# Patient Record
Sex: MALE | Race: WHITE
[De-identification: names, ages, dates, MRNs, and addresses within clinical notes are randomized per-mention and may not be internally consistent; named-entity substitution may affect disease eponyms.]

---

## 2020-03-04 ENCOUNTER — HOSPITAL ENCOUNTER (OUTPATIENT)
Dept: HOSPITAL 76 - EMS | Age: 75
Discharge: ANOTHER HOSPITAL | End: 2020-03-04
Attending: SURGERY
Payer: OTHER GOVERNMENT

## 2020-03-04 DIAGNOSIS — S09.90XA: Primary | ICD-10-CM

## 2020-03-04 DIAGNOSIS — R41.82: ICD-10-CM
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
## 2020-12-27 ENCOUNTER — HOSPITAL ENCOUNTER (EMERGENCY)
Dept: HOSPITAL 76 - ED | Age: 75
Discharge: HOME | End: 2020-12-27
Payer: OTHER GOVERNMENT

## 2020-12-27 VITALS — SYSTOLIC BLOOD PRESSURE: 130 MMHG | DIASTOLIC BLOOD PRESSURE: 80 MMHG

## 2020-12-27 DIAGNOSIS — F41.9: ICD-10-CM

## 2020-12-27 DIAGNOSIS — Z79.82: ICD-10-CM

## 2020-12-27 DIAGNOSIS — I10: ICD-10-CM

## 2020-12-27 DIAGNOSIS — F43.10: ICD-10-CM

## 2020-12-27 DIAGNOSIS — R45.851: ICD-10-CM

## 2020-12-27 DIAGNOSIS — Z72.820: ICD-10-CM

## 2020-12-27 DIAGNOSIS — F32.9: Primary | ICD-10-CM

## 2020-12-27 LAB
ALBUMIN DIAFP-MCNC: 4.2 G/DL (ref 3.2–5.5)
ALBUMIN/GLOB SERPL: 1.1 {RATIO} (ref 1–2.2)
ALP SERPL-CCNC: 91 IU/L (ref 42–121)
ALT SERPL W P-5'-P-CCNC: 19 IU/L (ref 10–60)
AMPHET UR QL SCN: NEGATIVE
ANION GAP SERPL CALCULATED.4IONS-SCNC: 13 MMOL/L (ref 6–13)
APAP SERPL-MCNC: < 10 UG/ML (ref 10–30)
AST SERPL W P-5'-P-CCNC: 29 IU/L (ref 10–42)
BASOPHILS NFR BLD AUTO: 0.1 10^3/UL (ref 0–0.1)
BASOPHILS NFR BLD AUTO: 0.6 %
BENZODIAZ UR QL SCN: NEGATIVE
BILIRUB BLD-MCNC: 0.9 MG/DL (ref 0.2–1)
BUN SERPL-MCNC: 12 MG/DL (ref 6–20)
CALCIUM UR-MCNC: 9.6 MG/DL (ref 8.5–10.3)
CHLORIDE SERPL-SCNC: 96 MMOL/L (ref 101–111)
CLARITY UR REFRACT.AUTO: CLEAR
CO2 SERPL-SCNC: 28 MMOL/L (ref 21–32)
COCAINE UR-SCNC: NEGATIVE UMOL/L
CREAT SERPLBLD-SCNC: 1.1 MG/DL (ref 0.6–1.2)
EOSINOPHIL # BLD AUTO: 0.3 10^3/UL (ref 0–0.7)
EOSINOPHIL NFR BLD AUTO: 2.1 %
ERYTHROCYTE [DISTWIDTH] IN BLOOD BY AUTOMATED COUNT: 11.9 % (ref 12–15)
GLOBULIN SER-MCNC: 4 G/DL (ref 2.1–4.2)
GLUCOSE SERPL-MCNC: 98 MG/DL (ref 70–100)
GLUCOSE UR QL STRIP.AUTO: NEGATIVE MG/DL
HGB UR QL STRIP: 16.8 G/DL (ref 14–18)
KETONES UR QL STRIP.AUTO: NEGATIVE MG/DL
LIPASE SERPL-CCNC: 37 U/L (ref 22–51)
LYMPHOCYTES # SPEC AUTO: 1 10^3/UL (ref 1.5–3.5)
LYMPHOCYTES NFR BLD AUTO: 7.5 %
MCH RBC QN AUTO: 32.4 PG (ref 27–31)
MCHC RBC AUTO-ENTMCNC: 34.4 G/DL (ref 32–36)
MCV RBC AUTO: 94 FL (ref 80–94)
METHADONE UR QL SCN: NEGATIVE
METHAMPHET UR QL SCN: NEGATIVE
MONOCYTES # BLD AUTO: 1.3 10^3/UL (ref 0–1)
MONOCYTES NFR BLD AUTO: 10.2 %
NEUTROPHILS # BLD AUTO: 10 10^3/UL (ref 1.5–6.6)
NEUTROPHILS # SNV AUTO: 12.6 X10^3/UL (ref 4.8–10.8)
NEUTROPHILS NFR BLD AUTO: 79.1 %
NITRITE UR QL STRIP.AUTO: NEGATIVE
OPIATES UR QL SCN: NEGATIVE
PDW BLD AUTO: 9.1 FL (ref 7.4–11.4)
PH UR STRIP.AUTO: 6.5 PH (ref 5–7.5)
PLATELET # BLD: 174 10^3/UL (ref 130–450)
PROT SPEC-MCNC: 8.2 G/DL (ref 6.7–8.2)
PROT UR STRIP.AUTO-MCNC: NEGATIVE MG/DL
RBC # UR STRIP.AUTO: NEGATIVE /UL
RBC MAR: 5.19 10^6/UL (ref 4.7–6.1)
SALICYLATES SERPL-MCNC: < 6 MG/DL
SODIUM SERPLBLD-SCNC: 137 MMOL/L (ref 135–145)
SP GR UR STRIP.AUTO: 1.02 (ref 1–1.03)
UROBILINOGEN UR QL STRIP.AUTO: (no result) E.U./DL
UROBILINOGEN UR STRIP.AUTO-MCNC: NEGATIVE MG/DL
VOLATILE DRUGS POS SERPL SCN: (no result)

## 2020-12-27 PROCEDURE — 80307 DRUG TEST PRSMV CHEM ANLYZR: CPT

## 2020-12-27 PROCEDURE — 36415 COLL VENOUS BLD VENIPUNCTURE: CPT

## 2020-12-27 PROCEDURE — 85025 COMPLETE CBC W/AUTO DIFF WBC: CPT

## 2020-12-27 PROCEDURE — 80320 DRUG SCREEN QUANTALCOHOLS: CPT

## 2020-12-27 PROCEDURE — 99283 EMERGENCY DEPT VISIT LOW MDM: CPT

## 2020-12-27 PROCEDURE — 81001 URINALYSIS AUTO W/SCOPE: CPT

## 2020-12-27 PROCEDURE — 80306 DRUG TEST PRSMV INSTRMNT: CPT

## 2020-12-27 PROCEDURE — 83690 ASSAY OF LIPASE: CPT

## 2020-12-27 PROCEDURE — 93005 ELECTROCARDIOGRAM TRACING: CPT

## 2020-12-27 PROCEDURE — 80329 ANALGESICS NON-OPIOID 1 OR 2: CPT

## 2020-12-27 PROCEDURE — 84443 ASSAY THYROID STIM HORMONE: CPT

## 2020-12-27 PROCEDURE — 81003 URINALYSIS AUTO W/O SCOPE: CPT

## 2020-12-27 PROCEDURE — 80053 COMPREHEN METABOLIC PANEL: CPT

## 2020-12-27 PROCEDURE — 99281 EMR DPT VST MAYX REQ PHY/QHP: CPT

## 2020-12-27 PROCEDURE — 87086 URINE CULTURE/COLONY COUNT: CPT

## 2020-12-27 NOTE — ED PHYSICIAN DOCUMENTATION
History of Present Illness





- Stated complaint


Stated Complaint: MHE





- Chief complaint


Chief Complaint: MHE





- History obtained from


History obtained from: Patient





- Additonal information


Additional information: 





75-year-old man with past medical history of depression, PTSD presents with 

passive suicidal ideation over the past couple days.  He states that his wife 

was just in the hospital last week and initially they thought she was not going 

to live, however she was able to be discharged home.  During her hospitalization

he stayed up with her and did not sleep adequately at night for the past 4 days.

 After discharge he continued to have difficulty sleeping.  He also is endorsing

thought disturbances with persistent anxiety and depression, feeling Like he 

wants to die but without any active plan.  He says that he had these thoughts in

the past and that his mental health professional advised to take their firearms 

from the home which they did.  Patient denies AVH or homicidal ideation.  Denies

any other symptoms.





Review of Systems


Ten Systems: 10 systems reviewed and negative


Psychiatric: reports: Depressed, Suicidal





PD PAST MEDICAL HISTORY





- Past Medical History


Past Medical History: Yes


Cardiovascular: Hypertension, High cholesterol


Psych: Post traumatic stress disorder





- Past Surgical History


Past Surgical History: Yes


General: Other


Cardiovascular: Coronary stent





- Present Medications


Home Medications: 


                                Ambulatory Orders











 Medication  Instructions  Recorded  Confirmed


 


Aspirin [Aspir-Low] 81 mg PO DAILY 03/04/16 12/27/20


 


Atorvastatin Calcium 80 mg PO DAILY 03/04/16 12/27/20


 


Calcium Citrate/Vitamin D3 [[facility]vs 1 tab PO BID 03/04/16 12/27/20





Calcium Citrate-Vit D Tab]   


 


Citalopram [CeleXA] 40 mg PO DAILY 03/04/16 12/27/20


 


Levetiracetam [Keppra] 750 mg PO BID 03/04/16 12/27/20


 


Metoprolol Tartrate 12.5 mg PO BID 03/04/16 12/27/20


 


Omeprazole 20 mg PO DAILY 03/04/16 12/27/20


 


lisinopriL [Lisinopril] 5 mg PO DAILY 03/04/16 12/27/20


 


Meclizine [Antivert] 25 mg PO Q6H PRN #20 tablet 03/05/16 12/27/20


 


Clobetasol 0.05% Oint [Temovate 1 applic TOP PRN PRN 12/27/20 12/27/20





0.05% Oint]   


 


Ibuprofen [Motrin] 600 mg PO Q6H PRN 12/27/20 12/27/20


 


Melatonin/Pyridoxine [Melatonin 5 1 each PO QPM PRN 30 Days #30 12/27/20 





mg Tablet] tablet  


 


Sildenafil Citrate [Viagra] 100 mg PO ONCE PRN 12/27/20 12/27/20


 


Simethicone [Gas Relief] 180 mg PO PRN PRN 12/27/20 12/27/20


 


Sodium Fluoride [Prevident 5000] 1 applic TOP DAILY PM 12/27/20 12/27/20














- Allergies


Allergies/Adverse Reactions: 


                                    Allergies











Allergy/AdvReac Type Severity Reaction Status Date / Time


 


fluoxetine AdvReac  Anxiety Verified 12/27/20 10:39


 


oxycodone HCl * AdvReac  Hallucinati Verified 12/27/20 10:39





[From OxyContin]   ons  














- Social History


Does the pt smoke?: No


Smoking Status: Never smoker


Does the pt drink ETOH?: No


Does the pt have substance abuse?: Yes





- Immunizations


Immunizations are current?: Yes





- POLST


Patient has POLST: Yes





PD ED PE NORMAL





- Vitals


Vital signs reviewed: Yes





- General


General: Alert and oriented X 3





- HEENT


HEENT: Atraumatic, PERRL, EOMI





- Neck


Neck: Supple, no meningeal sign





- Cardiac


Cardiac: RRR





- Respiratory


Respiratory: No respiratory distress, Clear bilaterally





- Abdomen


Abdomen: Non tender, Non distended





- Male 


Male : Deferred





- Rectal


Rectal: Deferred





- Derm


Derm: Normal color





- Extremities


Extremities: No edema





- Neuro


Neuro: Alert and oriented X 3





- Psych


Psych: Other (good insight. normal affect. mood described as depressed)





Results





- Vitals


Vitals: 


                               Vital Signs - 24 hr











  12/27/20 12/27/20





  10:25 10:38


 


Temperature 36.0 C L 


 


Heart Rate 75 76


 


Respiratory 16 15





Rate  


 


Blood Pressure 134/80 H 130/80


 


O2 Saturation 96 99








                                     Oxygen











O2 Source                      Room air

















- EKG (time done)


  ** 1142


Rate: Rate (enter#) (71)


Rhythm: NSR


Intervals: Normal AL


QRS: Normal


Ischemia: Other (benign early repol)





- Labs


Labs: 


                                Laboratory Tests











  12/27/20 12/27/20 12/27/20





  11:13 11:20 11:20


 


WBC   12.6 H 


 


RBC   5.19 


 


Hgb   16.8 


 


Hct   48.8 


 


MCV   94.0 


 


MCH   32.4 H 


 


MCHC   34.4 


 


RDW   11.9 L 


 


Plt Count   174 


 


MPV   9.1 


 


Neut # (Auto)   10.0 H 


 


Lymph # (Auto)   1.0 L 


 


Mono # (Auto)   1.3 H 


 


Eos # (Auto)   0.3 


 


Baso # (Auto)   0.1 


 


Absolute Nucleated RBC   0.00 


 


Nucleated RBC %   0.0 


 


Sodium    137


 


Potassium    4.2


 


Chloride    96 L


 


Carbon Dioxide    28


 


Anion Gap    13.0


 


BUN    12


 


Creatinine    1.1


 


Estimated GFR (MDRD)    65 L


 


Glucose    98


 


Calcium    9.6


 


Total Bilirubin    0.9


 


AST    29


 


ALT    19


 


Alkaline Phosphatase    91


 


Total Protein    8.2


 


Albumin    4.2


 


Globulin    4.0


 


Albumin/Globulin Ratio    1.1


 


Lipase    37


 


TSH   


 


Urine Color  DARK YELLOW  


 


Urine Clarity  CLEAR  


 


Urine pH  6.5  


 


Ur Specific Gravity  1.020  


 


Urine Protein  NEGATIVE  


 


Urine Glucose (UA)  NEGATIVE  


 


Urine Ketones  NEGATIVE  


 


Urine Occult Blood  NEGATIVE  


 


Urine Nitrite  NEGATIVE  


 


Urine Bilirubin  NEGATIVE  


 


Urine Urobilinogen  0.2 (NORMAL)  


 


Ur Leukocyte Esterase  NEGATIVE  


 


Ur Microscopic Review  NOT INDICATED  


 


Urine Culture Comments  NOT INDICATED  


 


Salicylates    < 6.0


 


Urine Opiates Screen  NEGATIVE  


 


Ur Oxycodone Screen  NEGATIVE  


 


Urine Methadone Screen  NEGATIVE  


 


Ur Propoxyphene Screen  NEGATIVE  


 


Acetaminophen    < 10 L


 


Ur Barbiturates Screen  NEGATIVE  


 


Ur Tricyclics Screen  NEGATIVE  


 


Ur Phencyclidine Scrn  NEGATIVE  


 


Ur Amphetamine Screen  NEGATIVE  


 


U Methamphetamines Scrn  NEGATIVE  


 


U Benzodiazepines Scrn  NEGATIVE  


 


Urine Cocaine Screen  NEGATIVE  


 


U Cannabinoids Screen  NEGATIVE  


 


Ethyl Alcohol    < 5.0














  12/27/20





  11:20


 


WBC 


 


RBC 


 


Hgb 


 


Hct 


 


MCV 


 


MCH 


 


MCHC 


 


RDW 


 


Plt Count 


 


MPV 


 


Neut # (Auto) 


 


Lymph # (Auto) 


 


Mono # (Auto) 


 


Eos # (Auto) 


 


Baso # (Auto) 


 


Absolute Nucleated RBC 


 


Nucleated RBC % 


 


Sodium 


 


Potassium 


 


Chloride 


 


Carbon Dioxide 


 


Anion Gap 


 


BUN 


 


Creatinine 


 


Estimated GFR (MDRD) 


 


Glucose 


 


Calcium 


 


Total Bilirubin 


 


AST 


 


ALT 


 


Alkaline Phosphatase 


 


Total Protein 


 


Albumin 


 


Globulin 


 


Albumin/Globulin Ratio 


 


Lipase 


 


TSH  1.16


 


Urine Color 


 


Urine Clarity 


 


Urine pH 


 


Ur Specific Gravity 


 


Urine Protein 


 


Urine Glucose (UA) 


 


Urine Ketones 


 


Urine Occult Blood 


 


Urine Nitrite 


 


Urine Bilirubin 


 


Urine Urobilinogen 


 


Ur Leukocyte Esterase 


 


Ur Microscopic Review 


 


Urine Culture Comments 


 


Salicylates 


 


Urine Opiates Screen 


 


Ur Oxycodone Screen 


 


Urine Methadone Screen 


 


Ur Propoxyphene Screen 


 


Acetaminophen 


 


Ur Barbiturates Screen 


 


Ur Tricyclics Screen 


 


Ur Phencyclidine Scrn 


 


Ur Amphetamine Screen 


 


U Methamphetamines Scrn 


 


U Benzodiazepines Scrn 


 


Urine Cocaine Screen 


 


U Cannabinoids Screen 


 


Ethyl Alcohol 














PD MEDICAL DECISION MAKING





- ED course


ED course: 





75-year-old man with history of PTSD on citalopram presented with suicidal ideat

ion without a plan.  Discussed at length with social work who provided extensive

resources for outpatient counseling.  Patient endorsing that he thinks this is 

all related to insomnia.  Will give prescription for melatonin.  Strict return 

precautions given.  Patient contracts for safety and there are no firearms in 

the home.  Follow-up outpatient mental health.





Departure





- Departure


Disposition: 01 Home, Self Care


Clinical Impression: 


 Depression, PTSD (post-traumatic stress disorder), Sleep deprivation





Condition: Good


Instructions:  ED Depression


Prescriptions: 


Melatonin/Pyridoxine [Melatonin 5 mg Tablet] 1 each PO QPM PRN 30 Days #30 

tablet


 PRN Reason: Insomnia

## 2021-02-21 ENCOUNTER — HOSPITAL ENCOUNTER (OUTPATIENT)
Dept: HOSPITAL 76 - EMS | Age: 76
Discharge: ANOTHER HOSPITAL | End: 2021-02-21
Attending: EMERGENCY MEDICINE
Payer: OTHER GOVERNMENT

## 2021-02-21 ENCOUNTER — HOSPITAL ENCOUNTER (EMERGENCY)
Dept: HOSPITAL 76 - ED | Age: 76
Discharge: HOME | End: 2021-02-21
Payer: OTHER GOVERNMENT

## 2021-02-21 VITALS — SYSTOLIC BLOOD PRESSURE: 140 MMHG | DIASTOLIC BLOOD PRESSURE: 68 MMHG

## 2021-02-21 DIAGNOSIS — R13.10: Primary | ICD-10-CM

## 2021-02-21 DIAGNOSIS — K21.9: ICD-10-CM

## 2021-02-21 DIAGNOSIS — Z79.82: ICD-10-CM

## 2021-02-21 DIAGNOSIS — Z95.5: ICD-10-CM

## 2021-02-21 DIAGNOSIS — G40.909: ICD-10-CM

## 2021-02-21 DIAGNOSIS — F41.9: ICD-10-CM

## 2021-02-21 DIAGNOSIS — I25.10: ICD-10-CM

## 2021-02-21 DIAGNOSIS — R91.8: ICD-10-CM

## 2021-02-21 DIAGNOSIS — E78.00: ICD-10-CM

## 2021-02-21 DIAGNOSIS — I10: ICD-10-CM

## 2021-02-21 DIAGNOSIS — Z87.891: ICD-10-CM

## 2021-02-21 LAB
ALBUMIN DIAFP-MCNC: 3.7 G/DL (ref 3.2–5.5)
ALBUMIN/GLOB SERPL: 1.2 {RATIO} (ref 1–2.2)
ALP SERPL-CCNC: 74 IU/L (ref 42–121)
ALT SERPL W P-5'-P-CCNC: 16 IU/L (ref 10–60)
ANION GAP SERPL CALCULATED.4IONS-SCNC: 8 MMOL/L (ref 6–13)
AST SERPL W P-5'-P-CCNC: 23 IU/L (ref 10–42)
BASOPHILS NFR BLD AUTO: 0.1 10^3/UL (ref 0–0.1)
BASOPHILS NFR BLD AUTO: 0.7 %
BILIRUB BLD-MCNC: 0.9 MG/DL (ref 0.2–1)
BUN SERPL-MCNC: 11 MG/DL (ref 6–20)
CALCIUM UR-MCNC: 8.6 MG/DL (ref 8.5–10.3)
CHLORIDE SERPL-SCNC: 102 MMOL/L (ref 101–111)
CO2 SERPL-SCNC: 25 MMOL/L (ref 21–32)
CREAT SERPLBLD-SCNC: 0.9 MG/DL (ref 0.6–1.2)
EOSINOPHIL # BLD AUTO: 0.1 10^3/UL (ref 0–0.7)
EOSINOPHIL NFR BLD AUTO: 1.8 %
ERYTHROCYTE [DISTWIDTH] IN BLOOD BY AUTOMATED COUNT: 12.3 % (ref 12–15)
GLOBULIN SER-MCNC: 3.1 G/DL (ref 2.1–4.2)
GLUCOSE SERPL-MCNC: 87 MG/DL (ref 70–100)
HGB UR QL STRIP: 15.3 G/DL (ref 14–18)
LYMPHOCYTES # SPEC AUTO: 1.1 10^3/UL (ref 1.5–3.5)
LYMPHOCYTES NFR BLD AUTO: 14.5 %
MCH RBC QN AUTO: 31.5 PG (ref 27–31)
MCHC RBC AUTO-ENTMCNC: 33.3 G/DL (ref 32–36)
MCV RBC AUTO: 94.7 FL (ref 80–94)
MONOCYTES # BLD AUTO: 0.7 10^3/UL (ref 0–1)
MONOCYTES NFR BLD AUTO: 9 %
NEUTROPHILS # BLD AUTO: 5.3 10^3/UL (ref 1.5–6.6)
NEUTROPHILS # SNV AUTO: 7.2 X10^3/UL (ref 4.8–10.8)
NEUTROPHILS NFR BLD AUTO: 73.7 %
PDW BLD AUTO: 9.3 FL (ref 7.4–11.4)
PLATELET # BLD: 174 10^3/UL (ref 130–450)
PROT SPEC-MCNC: 6.8 G/DL (ref 6.7–8.2)
RBC MAR: 4.86 10^6/UL (ref 4.7–6.1)

## 2021-02-21 PROCEDURE — 84484 ASSAY OF TROPONIN QUANT: CPT

## 2021-02-21 PROCEDURE — 36415 COLL VENOUS BLD VENIPUNCTURE: CPT

## 2021-02-21 PROCEDURE — 99284 EMERGENCY DEPT VISIT MOD MDM: CPT

## 2021-02-21 PROCEDURE — 93005 ELECTROCARDIOGRAM TRACING: CPT

## 2021-02-21 PROCEDURE — 71260 CT THORAX DX C+: CPT

## 2021-02-21 PROCEDURE — 85025 COMPLETE CBC W/AUTO DIFF WBC: CPT

## 2021-02-21 PROCEDURE — 80053 COMPREHEN METABOLIC PANEL: CPT

## 2021-02-21 NOTE — CT REPORT
PROCEDURE:  CHEST W

 

INDICATIONS:  IV and PO just before scan, CP, diff swallowing

 

CONTRAST:  IV CONTRAST: Optiray 320 ml: 100 PO CONTRAST: Isovue 300 ml50

 

TECHNIQUE:  

After the administration of intravenous contrast, 5 mm thick sections acquired from the pulmonary api
stewart to the posterior costophrenic angles.  7 mm thick coronal MIP reformats were acquired.  For radia
tion dose reduction, the following was used:  automated exposure control, adjustment of mA and/or kV 
according to patient size.

 

COMPARISON:  Single view chest x-ray 5/13/2011.

 

FINDINGS:  

Image quality:  Excellent.  

 

Lungs and pleura: No consolidation. 1.0 cm groundglass nodule noted in the right upper lobe. No pleur
al effusions or pneumothorax.  Central and peripheral airways are patent and normal in caliber.  

 

Mediastinum:  Heart size is normal. Atherosclerotic calcifications noted in the aorta, great vessels 
and coronary vasculature. 7 mm subpleural nodule noted in the lateral aspect of the lingula of left u
pper lobe. Atelectasis noted in the dependent portion of the bases bilaterally. No pericardial effusi
on.  No mediastinal or hilar adenopathy by size criteria.  Thoracic aorta and central pulmonary arter
ies are normal in size.  Esophagus is normal in caliber.  No hiatal hernia.  

 

Bones and chest wall:  No suspicious bony lesions.  No vertebral body compression fractures. Spine de
generative disc disease and facet arthropathy are noted.  No axillary or supraclavicular adenopathy b
y size criteria.  Thyroid gland is normal.  

 

Abdomen:  Left renal cysts. Diffuse fatty infiltration of the visualized liver. Visualized upper abdo
adrián solid organs otherwise appear normal.  Upper abdominal bowel loops are normal in caliber.  

 

IMPRESSION:  

 

1. No lung consolidation or pleural effusions.

 

2. 1 cm groundglass nodule in the right upper lobe which could be inflammatory/infectious or neoplast
ic. Recommend repeat CT scan of the chest in 6-12 months based on criteria outlined below.

 

3. 7 mm solid nodule in the lingula of the left upper lobe. Recommend reevaluation at time of follow-
up CT scan. 4. Atherosclerosis including dense atherosclerotic calcifications in the coronary vascula
ture.

 

5. Hepatic steatosis.

 

6. Left renal cysts.

 

7. Atherosclerosis including dense atherosclerotic calcifications of the coronary vasculature.

 

 

 

Fleischner Society criteria for lung nodule followup.

 

Solid nodules

 

Solitary nodule size: <6 mm 

*  low risk patients: no follow-up needed 

*  high risk patients: optional CT at 12 months

 

Solitary nodule size: 6-8 mm

*  low risk patients: follow-up at 6-12 months, then consider further follow-up at 18-24 months 

*  high risk patients: initial follow-up CT at 6-12 months and then at 18-24 months if no change

 

Solitary nodule size: >8 mm

*  either low or high risk patients 

*  consider follow-up CT at 3 months, and/or CT-PET, and/or biopsy

 

Multiple nodules size: <6 mm

*  low risk patients: no routine follow-up 

*  high risk patients: optional CT at 12 months

 

Multiple nodules size: 6-8 mm

*  low risk patients: follow-up at 3-6 months, then consider further follow-up at 18-24 months 

*  high risk patients: follow-up at 3-6 months, then at 18-24 months if no change

 

Multiple nodules size: >8 mm

*  low risk patients: follow-up at 3-6 months, then consider further follow-up at 18-24 months 

*  high risk patients: follow-up at 3-6 months, then at 18-24 months if no change

 

 

Subsolid nodules

 

Solitary pure ground-glass nodule

*  nodule size <6mm 

*  no CT follow-up required

*  nodule size ?6mm 

*  follow up CT at 6-12 months, then every 2 years until 5 years

 

Solitary part-solid nodule

*  nodule size <6mm 

*  no CT follow-up required

*  nodule size ?6mm 

*  follow-up CT at 3-6 months 

*  if unchanged, and solid component remains <6mm, then annual follow-up for 5 years

 

Multiple subsolid nodules

*  nodule size <6mm 

*  follow-up CT at 3-6 months 

*  consider further follow-up at 2 and 4 years if stable

*  nodule size ?6mm 

*  follow-up CT at 3-6 months 

*  subsequent management based on the most suspicious nodule(s)

 

 

Recommendations do not apply to lung cancer screening, patient's with immunosuppression or patients w
ith known primary cancer.

 

 

 

 

Reviewed by: Jihan Mckinney MD, PhD on 2/21/2021 3:23 PM PST

Approved by: Jihan Mckinney MD, PhD on 2/21/2021 3:23 PM PST

 

 

Station ID:  [id number]

## 2021-02-21 NOTE — ED PHYSICIAN DOCUMENTATION
PD HPI CHEST PAIN





- Stated complaint


Stated Complaint: HARD TIME SWALLOWING





- History obtained from


History obtained from: Patient, EMS





- Additional information


Additional information: 





Progressively for the last 5 days this 75-year-old gentleman has had difficulty 

swallowing.  He has a history of coronary disease with stenting, seizure 

disorder with no recent seizures, hypertension and hypercholesterolemia.  He 

also has reflux.  [facility] 

[facility], for presumed suicidal ideation.  He has been under a lot of stress about 

that and related to his wife.  For the last 5 days he feels like after he 

swallows anything, and it does not matter what, he feels like it gets stuck in 

the anterior left chest and he feels like he has to burp but cannot.  There is 

no associated vomiting.  He is scared to eat.  Denies current chest pain or 

trouble breathing.  No abdominal pain.  He does have a history of reflux and 

GERD.  Does not recall if he is ever had an upper endoscopy.





Review of Systems


Ten Systems: 10 systems reviewed and negative


Constitutional: denies: Fever, Chills


Cardiac: denies: Palpitations


Respiratory: denies: Dyspnea, Cough


GI: denies: Abdominal Pain, Nausea





PD PAST MEDICAL HISTORY





- Past Medical History


Cardiovascular: Hypertension, High cholesterol


Psych: Post traumatic stress disorder





- Past Surgical History


Past Surgical History: Yes


General: Other


Cardiovascular: Coronary stent





- Present Medications


Home Medications: 


                                Ambulatory Orders











 Medication  Instructions  Recorded  Confirmed


 


Aspirin [Aspir-Low] 81 mg PO DAILY 03/04/16 12/27/20


 


Atorvastatin Calcium 80 mg PO DAILY 03/04/16 12/27/20


 


Calcium Citrate/Vitamin D3 [[facility]vs 1 tab PO BID 03/04/16 12/27/20





Calcium Citrate-Vit D Tab]   


 


Citalopram [CeleXA] 40 mg PO DAILY 03/04/16 12/27/20


 


Levetiracetam [Keppra] 750 mg PO BID 03/04/16 12/27/20


 


Metoprolol Tartrate 12.5 mg PO BID 03/04/16 12/27/20


 


Omeprazole 20 mg PO DAILY 03/04/16 12/27/20


 


lisinopriL [Lisinopril] 5 mg PO DAILY 03/04/16 12/27/20


 


Meclizine [Antivert] 25 mg PO Q6H PRN #20 tablet 03/05/16 12/27/20


 


Clobetasol 0.05% Oint [Temovate 1 applic TOP PRN PRN 12/27/20 12/27/20





0.05% Oint]   


 


Ibuprofen [Motrin] 600 mg PO Q6H PRN 12/27/20 12/27/20


 


Melatonin/Pyridoxine [Melatonin 5 1 each PO QPM PRN 30 Days #30 12/27/20 





mg Tablet] tablet  


 


Sildenafil Citrate [Viagra] 100 mg PO ONCE PRN 12/27/20 12/27/20


 


Simethicone [Gas Relief] 180 mg PO PRN PRN 12/27/20 12/27/20


 


Sodium Fluoride [Prevident 5000] 1 applic TOP DAILY PM 12/27/20 12/27/20


 


LORazepam [Ativan] 1 mg PO TID PRN #12 tab 02/21/21 














- Allergies


Allergies/Adverse Reactions: 


                                    Allergies











Allergy/AdvReac Type Severity Reaction Status Date / Time


 


fluoxetine AdvReac  Anxiety Verified 02/21/21 12:32


 


oxycodone HCl * AdvReac  Hallucinati Verified 02/21/21 12:32





[From OxyContin]   ons  














- Social History


Does the pt smoke?: No


Smoking Status: Never smoker


Does the pt drink ETOH?: No


Does the pt have substance abuse?: Yes





- Immunizations


Immunizations are current?: Yes





- POLST


Patient has POLST: Yes





PD ED PE NORMAL





- Vitals


Vital signs reviewed: Yes





- General


General: Alert and oriented X 3, No acute distress





- HEENT


HEENT: PERRL, EOMI





- Neck


Neck: Supple, no meningeal sign, No bony TTP





- Cardiac


Cardiac: RRR, No murmur





- Respiratory


Respiratory: No respiratory distress, Clear bilaterally





- Abdomen


Abdomen: Soft, Non tender





- Back


Back: No CVA TTP, No spinal TTP





- Derm


Derm: Normal color, Warm and dry





- Extremities


Extremities: No edema, No calf tenderness / cord





- Neuro


Neuro: Alert and oriented X 3, Normal speech





Results





- Vitals


Vitals: 


                               Vital Signs - 24 hr











  02/21/21 02/21/21 02/21/21





  12:32 12:34 12:48


 


Temperature 37.3 C 37.3 C 


 


Heart Rate 100 100 88


 


Respiratory 16 16 18





Rate   


 


Blood Pressure 129/100 H 129/100 H 111/85 H


 


O2 Saturation 99 99 95














  02/21/21





  14:34


 


Temperature 


 


Heart Rate 70


 


Respiratory 16





Rate 


 


Blood Pressure 124/82 H


 


O2 Saturation 99








                                     Oxygen











O2 Source                      Room air

















- EKG (time done)


  ** 1229


Rate: Rate (enter#) (88)


Rhythm: NSR


Axis: Normal


Intervals: Normal ID


QRS: Normal


Ischemia: Non specific changes


Compare to prior EKG: Other (Appears to be J-point elevation that does not look 

ischemic in the anterior leads.  Compared with EKG dated 12/27/2020 this is 

slightly increased, but looking at the different R wave progression this is 

likely due to lead placement.)


Computer interpretation: Agree with computer





- Labs


Labs: 


                                Laboratory Tests











  02/21/21 02/21/21 02/21/21





  13:35 13:35 13:35


 


WBC  7.2  


 


RBC  4.86  


 


Hgb  15.3  


 


Hct  46.0  


 


MCV  94.7 H  


 


MCH  31.5 H  


 


MCHC  33.3  


 


RDW  12.3  


 


Plt Count  174  


 


MPV  9.3  


 


Neut # (Auto)  5.3  


 


Lymph # (Auto)  1.1 L  


 


Mono # (Auto)  0.7  


 


Eos # (Auto)  0.1  


 


Baso # (Auto)  0.1  


 


Absolute Nucleated RBC  0.00  


 


Nucleated RBC %  0.0  


 


Sodium   135 


 


Potassium   3.8 


 


Chloride   102 


 


Carbon Dioxide   25 


 


Anion Gap   8.0 


 


BUN   11 


 


Creatinine   0.9 


 


Estimated GFR (MDRD)   82 L 


 


Glucose   87 


 


Calcium   8.6 


 


Total Bilirubin   0.9 


 


AST   23 


 


ALT   16 


 


Alkaline Phosphatase   74 


 


Troponin I High Sens    10.7


 


Total Protein   6.8 


 


Albumin   3.7 


 


Globulin   3.1 


 


Albumin/Globulin Ratio   1.2 














PD MEDICAL DECISION MAKING





- ED course


ED course: 





75-year-old gentleman with history of tobacco abuse has had odynophagia with a 

sensation of having to burp after eating or taking his pills for the last 

several days.  His examination is normal, and his labs do not suggest any 

significant dehydration or coronary insult.  CT of the chest was done without 

obvious pathology of the esophagus, he did have incidental findings which were 

discussed with him and the need for follow-up was stressed.  He will need to 

have an upper endoscopy and requests admission to the hospital to have this done

but there is no indication for admission to the hospital or need for anything 

other than outpatient follow-up at this point.  There is a large overlay of 

anxiety for which he is treated with Ativan as well.





Departure





- Departure


Disposition: 01 Home, Self Care


Clinical Impression: 


 Odynophagia, Pulmonary nodule, Anxiety





Condition: Good


Record reviewed to determine appropriate education?: Yes


Instructions:  Dysphagia Tx


Follow-Up: 


Cristofer Ragland MD [Provider Admit Priv/Credential] - 


Prescriptions: 


LORazepam [Ativan] 1 mg PO TID PRN #12 tab


 PRN Reason: Anxiety


Comments: 


Your labs look good without any evidence of dehydration.  Your CAT scan shows 

some pulmonary nodules, you need a repeat CAT scan in 6 months to reassess.  

Otherwise you do need to follow-up for an upper endoscopy and the on-call 

surgeon is listed on this form, call tomorrow for an appointment.  Return if 

worsening.

## 2021-03-05 ENCOUNTER — HOSPITAL ENCOUNTER (EMERGENCY)
Dept: HOSPITAL 76 - ED | Age: 76
Discharge: HOME | End: 2021-03-05
Payer: OTHER GOVERNMENT

## 2021-03-05 VITALS — DIASTOLIC BLOOD PRESSURE: 80 MMHG | SYSTOLIC BLOOD PRESSURE: 125 MMHG

## 2021-03-05 DIAGNOSIS — I10: ICD-10-CM

## 2021-03-05 DIAGNOSIS — R63.4: Primary | ICD-10-CM

## 2021-03-05 DIAGNOSIS — F41.9: ICD-10-CM

## 2021-03-05 DIAGNOSIS — R13.10: ICD-10-CM

## 2021-03-05 DIAGNOSIS — F32.9: ICD-10-CM

## 2021-03-05 LAB
ALBUMIN DIAFP-MCNC: 3.9 G/DL (ref 3.2–5.5)
ALBUMIN/GLOB SERPL: 1.3 {RATIO} (ref 1–2.2)
ALP SERPL-CCNC: 64 IU/L (ref 42–121)
ALT SERPL W P-5'-P-CCNC: 18 IU/L (ref 10–60)
ANION GAP SERPL CALCULATED.4IONS-SCNC: 9 MMOL/L (ref 6–13)
AST SERPL W P-5'-P-CCNC: 30 IU/L (ref 10–42)
BASOPHILS NFR BLD AUTO: 0.1 10^3/UL (ref 0–0.1)
BASOPHILS NFR BLD AUTO: 0.8 %
BILIRUB BLD-MCNC: 0.8 MG/DL (ref 0.2–1)
BUN SERPL-MCNC: 13 MG/DL (ref 6–20)
CALCIUM UR-MCNC: 9.2 MG/DL (ref 8.5–10.3)
CHLORIDE SERPL-SCNC: 103 MMOL/L (ref 101–111)
CLARITY UR REFRACT.AUTO: CLEAR
CO2 SERPL-SCNC: 29 MMOL/L (ref 21–32)
CREAT SERPLBLD-SCNC: 1.2 MG/DL (ref 0.6–1.2)
EOSINOPHIL # BLD AUTO: 0.2 10^3/UL (ref 0–0.7)
EOSINOPHIL NFR BLD AUTO: 2.7 %
ERYTHROCYTE [DISTWIDTH] IN BLOOD BY AUTOMATED COUNT: 12.2 % (ref 12–15)
GFRSERPLBLD MDRD-ARVRAT: 59 ML/MIN/{1.73_M2} (ref 89–?)
GLOBULIN SER-MCNC: 2.9 G/DL (ref 2.1–4.2)
GLUCOSE SERPL-MCNC: 110 MG/DL (ref 70–100)
GLUCOSE UR QL STRIP.AUTO: NEGATIVE MG/DL
HCT VFR BLD AUTO: 42.9 % (ref 42–52)
HGB UR QL STRIP: 14.8 G/DL (ref 14–18)
KETONES UR QL STRIP.AUTO: NEGATIVE MG/DL
LIPASE SERPL-CCNC: 27 U/L (ref 22–51)
LYMPHOCYTES # SPEC AUTO: 1 10^3/UL (ref 1.5–3.5)
LYMPHOCYTES NFR BLD AUTO: 14.6 %
MCH RBC QN AUTO: 32.6 PG (ref 27–31)
MCHC RBC AUTO-ENTMCNC: 34.5 G/DL (ref 32–36)
MCV RBC AUTO: 94.5 FL (ref 80–94)
MONOCYTES # BLD AUTO: 0.6 10^3/UL (ref 0–1)
MONOCYTES NFR BLD AUTO: 8.7 %
NEUTROPHILS # BLD AUTO: 5.2 10^3/UL (ref 1.5–6.6)
NEUTROPHILS # SNV AUTO: 7.1 X10^3/UL (ref 4.8–10.8)
NEUTROPHILS NFR BLD AUTO: 72.8 %
NITRITE UR QL STRIP.AUTO: NEGATIVE
NRBC # BLD AUTO: 0 /100WBC
NRBC # BLD AUTO: 0 X10^3/UL
PDW BLD AUTO: 9.4 FL (ref 7.4–11.4)
PH UR STRIP.AUTO: 8.5 PH (ref 5–7.5)
PLATELET # BLD: 177 10^3/UL (ref 130–450)
POTASSIUM SERPL-SCNC: 4.9 MMOL/L (ref 3.5–5)
PROT SPEC-MCNC: 6.8 G/DL (ref 6.7–8.2)
PROT UR STRIP.AUTO-MCNC: NEGATIVE MG/DL
RBC # UR STRIP.AUTO: NEGATIVE /UL
RBC MAR: 4.54 10^6/UL (ref 4.7–6.1)
SODIUM SERPLBLD-SCNC: 141 MMOL/L (ref 135–145)
SP GR UR STRIP.AUTO: 1.02 (ref 1–1.03)
UROBILINOGEN UR QL STRIP.AUTO: (no result) E.U./DL
UROBILINOGEN UR STRIP.AUTO-MCNC: NEGATIVE MG/DL

## 2021-03-05 PROCEDURE — 83690 ASSAY OF LIPASE: CPT

## 2021-03-05 PROCEDURE — 83735 ASSAY OF MAGNESIUM: CPT

## 2021-03-05 PROCEDURE — 87086 URINE CULTURE/COLONY COUNT: CPT

## 2021-03-05 PROCEDURE — 80053 COMPREHEN METABOLIC PANEL: CPT

## 2021-03-05 PROCEDURE — 96374 THER/PROPH/DIAG INJ IV PUSH: CPT

## 2021-03-05 PROCEDURE — 81001 URINALYSIS AUTO W/SCOPE: CPT

## 2021-03-05 PROCEDURE — 84443 ASSAY THYROID STIM HORMONE: CPT

## 2021-03-05 PROCEDURE — 36415 COLL VENOUS BLD VENIPUNCTURE: CPT

## 2021-03-05 PROCEDURE — 85025 COMPLETE CBC W/AUTO DIFF WBC: CPT

## 2021-03-05 PROCEDURE — 81003 URINALYSIS AUTO W/O SCOPE: CPT

## 2021-03-05 PROCEDURE — 82652 VIT D 1 25-DIHYDROXY: CPT

## 2021-03-05 PROCEDURE — 99284 EMERGENCY DEPT VISIT MOD MDM: CPT

## 2021-03-05 PROCEDURE — 96375 TX/PRO/DX INJ NEW DRUG ADDON: CPT

## 2021-03-05 NOTE — ED PHYSICIAN DOCUMENTATION
PD HPI NVD





- Stated complaint


Stated Complaint: UNABLE TO EAT





- Chief complaint


Chief Complaint: Abd Pain





- History obtained from


History obtained from: Patient, Family





- History of Present Illness


Timing - onset: How many months ago (3)


Timing - duration: Months (3)


Timing - details: Gradual onset, Still present (worse the past week.)


Associated symptoms: Chest pain (states has tightness and discomfort when trying

to eat or even drink fluids.), Weight loss (he states lost 60 lbs over the past 

3 months.).  No: Fever, Abdominal pain


Contributing factors: No: Sick contact, Bad food, Diabetes


Improved by: No: Eating, Meds (had done enema at home, with some mild stool out 

(firm chunk, but feeling that there is still some at rectum).)


Worsened by: Eating


Similar symptoms before: Has not had sx before


Recently seen: Emergency Dept (seen in ER with CT chest without soft tissue 

findings. Seen in follow up at Surgery, Dr. Schmitz, and is schedules for barium 

swallow on 16th and EGD on 25th of this month.)





Review of Systems


Constitutional: denies: Fever, Chills


Nose: denies: Rhinorrhea / runny nose, Congestion


Throat: denies: Sore throat


Cardiac: reports: Chest pain / pressure (with eating in lower substernal area.).

 denies: Palpitations


Respiratory: denies: Dyspnea, Cough


GI: reports: Nausea, Vomiting, Constipation (for the past month).  denies: 

Abdominal Pain, Diarrhea


Neurologic: reports: Generalized weakness.  denies: Near syncope, Altered mental

status


Psychiatric: reports: Depressed (feeling stress with his wife having been in 

hospital then care facility for rehab the past few months. Wife home recently 

and is also stressed of caring for her. No home health aides. Has home visint 

nurse couple times per week (for his wife).), Anxiety, Insomnia


Endocrine: reports: Weight loss.  denies: Weight gain


Immunocompromised: denies: Immunocompromised





PD PAST MEDICAL HISTORY





- Past Medical History


Past Medical History: Yes


Cardiovascular: Hypertension, High cholesterol


Respiratory: None


Neuro: None


Endocrine/Autoimmune: None


Psych: Post traumatic stress disorder





- Past Surgical History


Past Surgical History: Yes


General: Other


Cardiovascular: Coronary stent





- Present Medications


Home Medications: 


                                Ambulatory Orders











 Medication  Instructions  Recorded  Confirmed


 


Aspirin [Aspir-Low] 81 mg PO DAILY 03/04/16 12/27/20


 


Atorvastatin Calcium 80 mg PO DAILY 03/04/16 12/27/20


 


Calcium Citrate/Vitamin D3 [Cvs 1 tab PO BID 03/04/16 12/27/20





Calcium Citrate-Vit D Tab]   


 


Citalopram [CeleXA] 40 mg PO DAILY 03/04/16 12/27/20


 


Levetiracetam [Keppra] 750 mg PO BID 03/04/16 12/27/20


 


Metoprolol Tartrate 12.5 mg PO BID 03/04/16 12/27/20


 


Omeprazole 20 mg PO DAILY 03/04/16 12/27/20


 


lisinopriL [Lisinopril] 5 mg PO DAILY 03/04/16 12/27/20


 


Meclizine [Antivert] 25 mg PO Q6H PRN #20 tablet 03/05/16 12/27/20


 


Clobetasol 0.05% Oint [Temovate 1 applic TOP PRN PRN 12/27/20 12/27/20





0.05% Oint]   


 


Ibuprofen [Motrin] 600 mg PO Q6H PRN 12/27/20 12/27/20


 


Melatonin/Pyridoxine [Melatonin 5 1 each PO QPM PRN 30 Days #30 12/27/20 





mg Tablet] tablet  


 


Sildenafil Citrate [Viagra] 100 mg PO ONCE PRN 12/27/20 12/27/20


 


Simethicone [Gas Relief] 180 mg PO PRN PRN 12/27/20 12/27/20


 


Sodium Fluoride [Prevident 5000] 1 applic TOP DAILY PM 12/27/20 12/27/20


 


LORazepam [Ativan] 1 mg PO TID PRN #12 tab 02/21/21 


 


Famotidine [Pepcid] 20 mg PO DAILY #20 tablet 03/05/21 


 


LORazepam [Ativan] 1 mg PO QPM PRN #10 tablet 03/05/21 


 


Mag Hydrox/Aluminum Hyd/Simeth 15 ml PO QID 7 Days #1 bottle 03/05/21 





[Mylanta Maximum Strength Liq]   


 


Metoclopramide [Reglan] 5 mg PO BID #10 tablet 03/05/21 














- Allergies


Allergies/Adverse Reactions: 


                                    Allergies











Allergy/AdvReac Type Severity Reaction Status Date / Time


 


fluoxetine AdvReac  Anxiety Verified 03/05/21 10:36


 


oxycodone HCl * AdvReac  Hallucinati Verified 03/05/21 10:36





[From OxyContin]   ons  














- Social History


Does the pt smoke?: No


Smoking Status: Never smoker


Does the pt drink ETOH?: No


Does the pt have substance abuse?: Yes





- Immunizations


Immunizations are current?: Yes





- POLST


Patient has POLST: Yes





PD ED PE NORMAL





- Vitals


Vital signs reviewed: Yes





- General


General: Alert and oriented X 3, No acute distress, Well developed/nourished





- HEENT


HEENT: Ears normal, Pharynx benign.  No: Moist mucous membranes





- Neck


Neck: Supple, no meningeal sign, No adenopathy





- Cardiac


Cardiac: RRR, No murmur





- Respiratory


Respiratory: Clear bilaterally





- Abdomen


Abdomen: Normal bowel sounds, Soft, Non tender, Non distended, No organomegaly





- Male 


Male : Deferred





- Rectal


Rectal: Other (rectal vault empty. Normal colored mild stool. )





Results





- Vitals


Vitals: 


                               Vital Signs - 24 hr











  03/05/21





  10:29


 


Temperature 36.2 C L


 


Heart Rate 59 L


 


Respiratory 16





Rate 


 


Blood Pressure 125/80


 


O2 Saturation 96








                                     Oxygen











O2 Source                      Room air

















- Labs


Labs: 


                                Laboratory Tests











  03/05/21 03/05/21 03/05/21





  11:08 11:08 11:08


 


WBC  7.1  


 


RBC  4.54 L  


 


Hgb  14.8  


 


Hct  42.9  


 


MCV  94.5 H  


 


MCH  32.6 H  


 


MCHC  34.5  


 


RDW  12.2  


 


Plt Count  177  


 


MPV  9.4  


 


Neut # (Auto)  5.2  


 


Lymph # (Auto)  1.0 L  


 


Mono # (Auto)  0.6  


 


Eos # (Auto)  0.2  


 


Baso # (Auto)  0.1  


 


Absolute Nucleated RBC  0.00  


 


Nucleated RBC %  0.0  


 


Sodium   141 


 


Potassium   4.9 


 


Chloride   103 


 


Carbon Dioxide   29 


 


Anion Gap   9.0 


 


BUN   13 


 


Creatinine   1.2 


 


Estimated GFR (MDRD)   59 L 


 


Glucose   110 H 


 


Calcium   9.2 


 


Magnesium    2.2


 


Total Bilirubin   0.8 


 


AST   30 


 


ALT   18 


 


Alkaline Phosphatase   64 


 


Total Protein   6.8 


 


Albumin   3.9 


 


Globulin   2.9 


 


Albumin/Globulin Ratio   1.3 


 


Lipase   27 


 


TSH   


 


Urine Color   


 


Urine Clarity   


 


Urine pH   


 


Ur Specific Gravity   


 


Urine Protein   


 


Urine Glucose (UA)   


 


Urine Ketones   


 


Urine Occult Blood   


 


Urine Nitrite   


 


Urine Bilirubin   


 


Urine Urobilinogen   


 


Ur Leukocyte Esterase   


 


Ur Microscopic Review   


 


Urine Culture Comments   














  03/05/21 03/05/21





  11:08 11:25


 


WBC  


 


RBC  


 


Hgb  


 


Hct  


 


MCV  


 


MCH  


 


MCHC  


 


RDW  


 


Plt Count  


 


MPV  


 


Neut # (Auto)  


 


Lymph # (Auto)  


 


Mono # (Auto)  


 


Eos # (Auto)  


 


Baso # (Auto)  


 


Absolute Nucleated RBC  


 


Nucleated RBC %  


 


Sodium  


 


Potassium  


 


Chloride  


 


Carbon Dioxide  


 


Anion Gap  


 


BUN  


 


Creatinine  


 


Estimated GFR (MDRD)  


 


Glucose  


 


Calcium  


 


Magnesium  


 


Total Bilirubin  


 


AST  


 


ALT  


 


Alkaline Phosphatase  


 


Total Protein  


 


Albumin  


 


Globulin  


 


Albumin/Globulin Ratio  


 


Lipase  


 


TSH  1.42 


 


Urine Color   YELLOW


 


Urine Clarity   CLEAR


 


Urine pH   8.5 H


 


Ur Specific Gravity   1.020


 


Urine Protein   NEGATIVE


 


Urine Glucose (UA)   NEGATIVE


 


Urine Ketones   NEGATIVE


 


Urine Occult Blood   NEGATIVE


 


Urine Nitrite   NEGATIVE


 


Urine Bilirubin   NEGATIVE


 


Urine Urobilinogen   0.2 (NORMAL)


 


Ur Leukocyte Esterase   NEGATIVE


 


Ur Microscopic Review   NOT INDICATED


 


Urine Culture Comments   NOT INDICATED














PD MEDICAL DECISION MAKING





- ED course


Complexity details: reviewed old records (recent CT chest without findings. Does

not have abd tenderness, so I do not feel CT abd would add info. EGD is next 

step. Pt scheduled with Dr. Schmitz for EGD 25th. I talked with Dr. Ragland, on 

call, who will work to get pt EGD sooner, perhaps this coming week (he is doing 

scopes on Tues).), reviewed results (labs are good, with normal CR/lytes, so not

seem too dehydrated. ), considered differential, d/w patient





Departure





- Departure


Disposition: 01 Home, Self Care


Clinical Impression: 


 Odynophagia, Anxiety, Weight loss





Depression


Qualifiers:


 Depression Type: unspecified Qualified Code(s): F32.9 - Major depressive 

disorder, single episode, unspecified





Condition: Stable


Record reviewed to determine appropriate education?: Yes


Follow-Up: 


ANGELA Schmitz MD [Provider Admit Priv/Credential] - 


Prescriptions: 


LORazepam [Ativan] 1 mg PO QPM PRN #10 tablet


 PRN Reason: Insomnia


Mag Hydrox/Aluminum Hyd/Simeth [Mylanta Maximum Strength Liq] 15 ml PO QID 7 

Days #1 bottle


Famotidine [Pepcid] 20 mg PO DAILY #20 tablet


Metoclopramide [Reglan] 5 mg PO BID #10 tablet


Comments: 


More frequent fluids and protein supplements such as Ensure to help with 

nutrition.





Sometimes symptoms such as yours can be from irritation of the esophagus from 

acid reflux.  Take famotidine daily for the next couple of weeks.  Antacids such

 as Mylanta which has simethicone for gas as well 4 times a day.  And Reglan 

twice daily to try to help with improved outflow from the stomach.





Lorazepam at night is a to help with sleep.





Suggest following up with counseling with the resources provided by the social 

worker, call for an appointment.  Also the  gave you information 

regarding home health aide to help with you and your wife.





I talked with the on-call surgeon.  Contact the surgery office and amongst the 

surgeons, one of them should be able to get you in for a scope sooner than the 

planned date of the 25th.  This would likely be as soon as this coming week, 

possibly on Tuesday.  Call the office for rescheduling of the scope and 

instructions.


Discharge Date/Time: 03/05/21 14:08

## 2021-03-09 ENCOUNTER — HOSPITAL ENCOUNTER (OUTPATIENT)
Dept: HOSPITAL 76 - SDS | Age: 76
Discharge: HOME | End: 2021-03-09
Attending: SURGERY
Payer: MEDICARE

## 2021-03-09 VITALS — SYSTOLIC BLOOD PRESSURE: 119 MMHG | DIASTOLIC BLOOD PRESSURE: 66 MMHG

## 2021-03-09 DIAGNOSIS — Z86.73: ICD-10-CM

## 2021-03-09 DIAGNOSIS — I10: ICD-10-CM

## 2021-03-09 DIAGNOSIS — Z79.899: ICD-10-CM

## 2021-03-09 DIAGNOSIS — K21.00: Primary | ICD-10-CM

## 2021-03-09 DIAGNOSIS — K29.70: ICD-10-CM

## 2021-03-09 DIAGNOSIS — I25.2: ICD-10-CM

## 2021-03-09 DIAGNOSIS — E66.3: ICD-10-CM

## 2021-03-09 DIAGNOSIS — E78.00: ICD-10-CM

## 2021-03-09 DIAGNOSIS — F41.9: ICD-10-CM

## 2021-03-09 DIAGNOSIS — F43.10: ICD-10-CM

## 2021-03-09 DIAGNOSIS — K22.2: ICD-10-CM

## 2021-03-09 DIAGNOSIS — I25.10: ICD-10-CM

## 2021-03-09 DIAGNOSIS — F17.290: ICD-10-CM

## 2021-03-09 DIAGNOSIS — Z95.5: ICD-10-CM

## 2021-03-09 DIAGNOSIS — F41.0: ICD-10-CM

## 2021-03-09 DIAGNOSIS — K29.80: ICD-10-CM

## 2021-03-09 DIAGNOSIS — Z79.82: ICD-10-CM

## 2021-03-09 DIAGNOSIS — Z20.822: ICD-10-CM

## 2021-03-09 LAB
B PARAPERT DNA SPEC QL NAA+PROBE: NOT DETECTED
B PERT DNA SPEC QL NAA+PROBE: NOT DETECTED
C PNEUM DNA NPH QL NAA+NON-PROBE: NOT DETECTED
FLUAV RNA RESP QL NAA+PROBE: NOT DETECTED
HAEM INFLU B DNA SPEC QL NAA+PROBE: NOT DETECTED
HCOV 229E RNA SPEC QL NAA+PROBE: NOT DETECTED
HCOV HKU1 RNA UPPER RESP QL NAA+PROBE: NOT DETECTED
HCOV NL63 RNA ASPIRATE QL NAA+PROBE: NOT DETECTED
HCOV OC43 RNA SPEC QL NAA+PROBE: NOT DETECTED
HMPV AG SPEC QL: NOT DETECTED
HPIV1 RNA NPH QL NAA+PROBE: NOT DETECTED
HPIV2 SPEC QL CULT: NOT DETECTED
HPIV3 AB TITR SER CF: NOT DETECTED {TITER}
HPIV4 RNA SPEC QL NAA+PROBE: NOT DETECTED
M PNEUMO DNA SPEC QL NAA+PROBE: NOT DETECTED
RSV RNA RESP QL NAA+PROBE: NOT DETECTED
RV+EV RNA SPEC QL NAA+PROBE: NOT DETECTED
SARS-COV-2 RNA PNL SPEC NAA+PROBE: NOT DETECTED

## 2021-03-09 PROCEDURE — 0DB48ZX EXCISION OF ESOPHAGOGASTRIC JUNCTION, VIA NATURAL OR ARTIFICIAL OPENING ENDOSCOPIC, DIAGNOSTIC: ICD-10-PCS | Performed by: SURGERY

## 2021-03-09 PROCEDURE — 0202U NFCT DS 22 TRGT SARS-COV-2: CPT

## 2021-03-09 PROCEDURE — 87631 RESP VIRUS 3-5 TARGETS: CPT

## 2021-03-09 PROCEDURE — 43239 EGD BIOPSY SINGLE/MULTIPLE: CPT

## 2021-03-09 NOTE — ANESTHESIA POST OP EVALUATION
Anesthesia Post Eval





- Post Anesthesia Eval


Vitals: 





                                Last Vital Signs











Temp  36.5 C   03/09/21 13:09


 


Pulse  56 L  03/09/21 13:09


 


Resp  18   03/09/21 13:09


 


BP  119/66   03/09/21 13:09


 


Pulse Ox  100   03/09/21 13:09











CV Function Including HR & BP: positive: Stable


Pain Control: positive: Satisfactory


Nausea & Vomiting: positive: Negative


Mental Status: positive: Baseline


Respiratory Status: Airway Patent


Hydration Status: Satisfactory


Anesthesia Complications: positive: None

## 2021-03-09 NOTE — ANESTHESIA
Pre-Anesthesia VS, & Labs





- Diagnosis





wt loss, dysphagia, odynophagia





- Procedure





EGD w/possible biopsies


Vital Signs: 





                                        











Temp Pulse Resp BP Pulse Ox


 


 36.6 C   63   18   117/77   97 


 


 03/09/21 09:18  03/09/21 09:18  03/09/21 09:18  03/09/21 09:18  03/09/21 09:18











Height: 6 ft 1 in


Weight (kg): 88 kg


Body Mass Index: 25.6


BMI Classification: Overweight





- NPO


Last Fluid Intake: sips with meds@0800





- Lab Results


Lab results reviewed: Yes





Home Medications and Allergies





                                        





Aspirin [Aspir-Low] 81 mg PO DAILY 03/04/16 


Atorvastatin Calcium 80 mg PO DAILY 03/04/16 


Calcium Citrate/Vitamin D3 [Cvs Calcium Citrate-Vit D Tab] 1 tab PO BID 03/04/16




Citalopram [CeleXA] 40 mg PO DAILY 03/04/16 


Levetiracetam [Keppra] 750 mg PO BID 03/04/16 


Metoprolol Tartrate 12.5 mg PO BID 03/04/16 


Omeprazole 20 mg PO DAILY 03/04/16 


lisinopriL [Lisinopril] 5 mg PO DAILY 03/04/16 


Clobetasol 0.05% Oint [Temovate 0.05% Oint] 1 applic TOP PRN PRN 12/27/20 


Ibuprofen [Motrin] 600 mg PO Q6H PRN 12/27/20 


Sildenafil Citrate [Viagra] 100 mg PO ONCE PRN 12/27/20 


Simethicone [Gas Relief] 180 mg PO PRN PRN 12/27/20 


Sodium Fluoride [Prevident 5000] 1 applic TOP DAILY PM 12/27/20 








Allergies/Adverse Reactions: 


                                    Allergies











Allergy/AdvReac Type Severity Reaction Status Date / Time


 


fluoxetine AdvReac  Anxiety Verified 03/05/21 10:36


 


oxycodone HCl * AdvReac  Hallucinati Verified 03/05/21 10:36





[From OxyContin]   ons  














Anes History & Medical History





- Anesthetic History


Anesthesia Complications: reports: No previous complications


Family history of Anesthesia Complications: Denies


Family history of Malignant Hyperthermia: Denies





- Medical History


Cardiovascular: reports: Hypertension, High cholesterol, MI


Pulmonary: reports: None


Gastrointestinal: reports: Other


Urinary: reports: None


Neuro: reports: None


Musculoskeletal: reports: None


Endocrine/Autoimmune: reports: None


Skin: reports: None


Smoking Status: Never smoker


History of Cancer?: No





- Surgical History


General: reports: Other


Cardiothoracic: reports: Coronary stent





Exam


General: Alert, Oriented x3, Cooperative


Dental: WNL


Mouth Opening: 3 Fingerbreadth


Neck Mobility: Normal


Mallampati classification: II


Thyromental Distance: 4-6 cm


Respiratory: Lungs clear, Normal breath sounds, No respiratory distress


Cardiovascular: Regular rate


Neurological: Normal speech


Mental/Cognitive Status: Alert/Oriented X3, Normal for patient


Cognitive Status: Within normal limits





Plan


Anesthesia Type: Total IV


Consent for Procedure(s) Verified and Reviewed: Yes


Code Status: Attempt Resuscitation


ASA classification: 3-Severe systemic disease


Is this case an emergency?: No

## 2021-03-13 ENCOUNTER — HOSPITAL ENCOUNTER (OUTPATIENT)
Dept: HOSPITAL 76 - EMS | Age: 76
End: 2021-03-13
Payer: OTHER GOVERNMENT

## 2021-03-13 DIAGNOSIS — R19.8: Primary | ICD-10-CM

## 2021-03-20 ENCOUNTER — HOSPITAL ENCOUNTER (EMERGENCY)
Dept: HOSPITAL 76 - ED | Age: 76
Discharge: HOME | End: 2021-03-20
Payer: MEDICARE

## 2021-03-20 VITALS — SYSTOLIC BLOOD PRESSURE: 131 MMHG | DIASTOLIC BLOOD PRESSURE: 75 MMHG

## 2021-03-20 DIAGNOSIS — F41.9: ICD-10-CM

## 2021-03-20 DIAGNOSIS — E86.0: Primary | ICD-10-CM

## 2021-03-20 DIAGNOSIS — F32.9: ICD-10-CM

## 2021-03-20 DIAGNOSIS — I10: ICD-10-CM

## 2021-03-20 LAB
ALBUMIN DIAFP-MCNC: 3.8 G/DL (ref 3.2–5.5)
ALBUMIN/GLOB SERPL: 1.2 {RATIO} (ref 1–2.2)
ALP SERPL-CCNC: 69 IU/L (ref 42–121)
ALT SERPL W P-5'-P-CCNC: 18 IU/L (ref 10–60)
ANION GAP SERPL CALCULATED.4IONS-SCNC: 14 MMOL/L (ref 6–13)
AST SERPL W P-5'-P-CCNC: 29 IU/L (ref 10–42)
BASOPHILS NFR BLD AUTO: 0.1 10^3/UL (ref 0–0.1)
BASOPHILS NFR BLD AUTO: 0.8 %
BILIRUB BLD-MCNC: 1 MG/DL (ref 0.2–1)
BUN SERPL-MCNC: 13 MG/DL (ref 6–20)
CALCIUM UR-MCNC: 9.4 MG/DL (ref 8.5–10.3)
CHLORIDE SERPL-SCNC: 99 MMOL/L (ref 101–111)
CLARITY UR REFRACT.AUTO: CLEAR
CO2 SERPL-SCNC: 25 MMOL/L (ref 21–32)
CREAT SERPLBLD-SCNC: 1.2 MG/DL (ref 0.6–1.2)
EOSINOPHIL # BLD AUTO: 0.3 10^3/UL (ref 0–0.7)
EOSINOPHIL NFR BLD AUTO: 3.6 %
ERYTHROCYTE [DISTWIDTH] IN BLOOD BY AUTOMATED COUNT: 12.5 % (ref 12–15)
GFRSERPLBLD MDRD-ARVRAT: 59 ML/MIN/{1.73_M2} (ref 89–?)
GLOBULIN SER-MCNC: 3.3 G/DL (ref 2.1–4.2)
GLUCOSE SERPL-MCNC: 90 MG/DL (ref 70–100)
GLUCOSE UR QL STRIP.AUTO: NEGATIVE MG/DL
HCT VFR BLD AUTO: 44.5 % (ref 42–52)
HGB UR QL STRIP: 15.4 G/DL (ref 14–18)
KETONES UR QL STRIP.AUTO: (no result) MG/DL
LIPASE SERPL-CCNC: 26 U/L (ref 22–51)
LYMPHOCYTES # SPEC AUTO: 1.4 10^3/UL (ref 1.5–3.5)
LYMPHOCYTES NFR BLD AUTO: 19.2 %
MAGNESIUM SERPL-MCNC: 2.2 MG/DL (ref 1.7–2.8)
MCH RBC QN AUTO: 31.9 PG (ref 27–31)
MCHC RBC AUTO-ENTMCNC: 34.6 G/DL (ref 32–36)
MCV RBC AUTO: 92.1 FL (ref 80–94)
MONOCYTES # BLD AUTO: 0.8 10^3/UL (ref 0–1)
MONOCYTES NFR BLD AUTO: 10.6 %
NEUTROPHILS # BLD AUTO: 4.7 10^3/UL (ref 1.5–6.6)
NEUTROPHILS # SNV AUTO: 7.1 X10^3/UL (ref 4.8–10.8)
NEUTROPHILS NFR BLD AUTO: 65.5 %
NITRITE UR QL STRIP.AUTO: NEGATIVE
NRBC # BLD AUTO: 0 /100WBC
NRBC # BLD AUTO: 0 X10^3/UL
PDW BLD AUTO: 10 FL (ref 7.4–11.4)
PH UR STRIP.AUTO: 8 PH (ref 5–7.5)
PHOSPHATE BLD-MCNC: 2.8 MG/DL (ref 2.5–4.6)
PLATELET # BLD: 175 10^3/UL (ref 130–450)
POTASSIUM SERPL-SCNC: 3.8 MMOL/L (ref 3.5–5)
PROT SPEC-MCNC: 7.1 G/DL (ref 6.7–8.2)
PROT UR STRIP.AUTO-MCNC: (no result) MG/DL
RBC # UR STRIP.AUTO: NEGATIVE /UL
RBC MAR: 4.83 10^6/UL (ref 4.7–6.1)
SODIUM SERPLBLD-SCNC: 138 MMOL/L (ref 135–145)
SP GR UR STRIP.AUTO: 1.02 (ref 1–1.03)
UROBILINOGEN UR QL STRIP.AUTO: (no result) E.U./DL
UROBILINOGEN UR STRIP.AUTO-MCNC: NEGATIVE MG/DL

## 2021-03-20 PROCEDURE — 93005 ELECTROCARDIOGRAM TRACING: CPT

## 2021-03-20 PROCEDURE — 36415 COLL VENOUS BLD VENIPUNCTURE: CPT

## 2021-03-20 PROCEDURE — 80053 COMPREHEN METABOLIC PANEL: CPT

## 2021-03-20 PROCEDURE — 87086 URINE CULTURE/COLONY COUNT: CPT

## 2021-03-20 PROCEDURE — 99283 EMERGENCY DEPT VISIT LOW MDM: CPT

## 2021-03-20 PROCEDURE — 83735 ASSAY OF MAGNESIUM: CPT

## 2021-03-20 PROCEDURE — 96360 HYDRATION IV INFUSION INIT: CPT

## 2021-03-20 PROCEDURE — 85025 COMPLETE CBC W/AUTO DIFF WBC: CPT

## 2021-03-20 PROCEDURE — 81003 URINALYSIS AUTO W/O SCOPE: CPT

## 2021-03-20 PROCEDURE — 84100 ASSAY OF PHOSPHORUS: CPT

## 2021-03-20 PROCEDURE — 83690 ASSAY OF LIPASE: CPT

## 2021-03-20 PROCEDURE — 81001 URINALYSIS AUTO W/SCOPE: CPT

## 2021-03-24 ENCOUNTER — HOSPITAL ENCOUNTER (EMERGENCY)
Dept: HOSPITAL 76 - ED | Age: 76
Discharge: HOME | End: 2021-03-24
Payer: MEDICARE

## 2021-03-24 VITALS — SYSTOLIC BLOOD PRESSURE: 142 MMHG | DIASTOLIC BLOOD PRESSURE: 80 MMHG

## 2021-03-24 DIAGNOSIS — I10: ICD-10-CM

## 2021-03-24 DIAGNOSIS — K21.9: Primary | ICD-10-CM

## 2021-03-24 DIAGNOSIS — K22.2: ICD-10-CM

## 2021-03-24 DIAGNOSIS — Z79.82: ICD-10-CM

## 2021-03-24 DIAGNOSIS — K59.00: ICD-10-CM

## 2021-03-24 DIAGNOSIS — R13.19: ICD-10-CM

## 2021-03-24 DIAGNOSIS — Z95.5: ICD-10-CM

## 2021-03-24 PROCEDURE — 99283 EMERGENCY DEPT VISIT LOW MDM: CPT

## 2021-03-24 PROCEDURE — 99284 EMERGENCY DEPT VISIT MOD MDM: CPT

## 2021-03-24 NOTE — ED PHYSICIAN DOCUMENTATION
History of Present Illness





- Stated complaint


Stated Complaint: UNABLE TO EAT/DRINK





- Chief complaint


Chief Complaint: Abd Pain





- History obtained from


History obtained from: Patient





- Additonal information


Additional information: 





75yM with pmh esophageal reflux with scarring at LES as well as anatomic 

deformity causing chronic dysphagia presents with request for surgery to correct

his issue. patient states he is depressed and can't deal with the burping and 

dysphagia any longer, feels overwhelmed at home, and "I need to get surgery 

today or I will harm myself". I discussed with patient and he denies active SI 

or plan, stating he has "too much to live for" but states he can't deal with his

symptoms any longer. denies recent changes. does endorse mild constipation. last

BM was hard stool a couple days ago. denies fevers, n/v abd pain urinary sx. 

denies HI/AVH. 





Review of Systems


Ten Systems: 10 systems reviewed and negative


Constitutional: denies: Fever


Cardiac: denies: Chest pain / pressure


Respiratory: denies: Dyspnea


GI: reports: Abdominal Pain, Constipation.  denies: Nausea, Vomiting, Diarrhea


: denies: Dysuria





PD PAST MEDICAL HISTORY





- Past Medical History


Past Medical History: Yes


Cardiovascular: Hypertension, High cholesterol, MI


Respiratory: None


Neuro: None


Endocrine/Autoimmune: None


GI: GERD


: None


HEENT: None


Psych: Depression, Anxiety, Panic attacks, Post traumatic stress disorder


Musculoskeletal: None


Derm: None





- Past Surgical History


Past Surgical History: Yes


General: Other


Cardiovascular: Coronary stent





- Present Medications


Home Medications: 


                                Ambulatory Orders











 Medication  Instructions  Recorded  Confirmed


 


Aspirin [Aspir-Low] 81 mg PO DAILY 03/04/16 12/27/20


 


Atorvastatin Calcium 80 mg PO DAILY 03/04/16 12/27/20


 


Calcium Citrate/Vitamin D3 [Cvs 1 tab PO BID 03/04/16 12/27/20





Calcium Citrate-Vit D Tab]   


 


Citalopram [CeleXA] 40 mg PO DAILY 03/04/16 12/27/20


 


Levetiracetam [Keppra] 750 mg PO BID 03/04/16 12/27/20


 


Metoprolol Tartrate 12.5 mg PO BID 03/04/16 12/27/20


 


Omeprazole 20 mg PO DAILY 03/04/16 12/27/20


 


lisinopriL [Lisinopril] 5 mg PO DAILY 03/04/16 12/27/20


 


Clobetasol 0.05% Oint [Temovate 1 applic TOP PRN PRN 12/27/20 12/27/20





0.05% Oint]   


 


Ibuprofen [Motrin] 600 mg PO Q6H PRN 12/27/20 12/27/20


 


Melatonin/Pyridoxine [Melatonin 5 1 each PO QPM PRN 30 Days #30 12/27/20 





mg Tablet] tablet  


 


Sodium Fluoride [Prevident 5000] 1 applic TOP DAILY PM 12/27/20 12/27/20


 


LORazepam [Ativan] 1 mg PO QPM PRN #10 tablet 03/05/21 


 


Mag Hydrox/Aluminum Hyd/Simeth 15 ml PO QID 7 Days #1 bottle 03/05/21 





[Mylanta Maximum Strength Liq]   


 


Metoclopramide [Reglan] 5 mg PO BID #10 tablet 03/05/21 


 


Lansoprazole [Prevacid] 0 mg DAILY 03/24/21 03/24/21


 


polyethylene glycoL 3350 [Miralax] 17 gm PO DAILY #238 gm 03/24/21 














- Allergies


Allergies/Adverse Reactions: 


                                    Allergies











Allergy/AdvReac Type Severity Reaction Status Date / Time


 


fluoxetine AdvReac  Anxiety Verified 03/24/21 09:16


 


oxycodone HCl * AdvReac  Hallucinati Verified 03/24/21 09:16





[From OxyContin]   ons  














- Social History


Does the pt smoke?: No


Smoking Status: Never smoker


Does the pt drink ETOH?: No


Does the pt have substance abuse?: Yes





- Immunizations


Immunizations are current?: Yes





- POLST


Patient has POLST: Yes





PD ED PE NORMAL





- Vitals


Vital signs reviewed: Yes





- General


General: Alert and oriented X 3, No acute distress, Well developed/nourished





- HEENT


HEENT: Atraumatic, PERRL, EOMI





- Neck


Neck: Supple, no meningeal sign





- Cardiac


Cardiac: RRR





- Respiratory


Respiratory: No respiratory distress, Clear bilaterally





- Abdomen


Abdomen: Non tender, Non distended





- Male 


Male : Deferred





- Rectal


Rectal: Other (hard brown stool in vault. normal prostate)





- Back


Back: No CVA TTP





- Derm


Derm: Normal color





- Extremities


Extremities: No deformity





- Neuro


Neuro: Alert and oriented X 3





- Psych


Psych: Other (depressed mood and affect)





Results





- Vitals


Vitals: 


                               Vital Signs - 24 hr











  03/24/21 03/24/21 03/24/21





  09:16 14:00 16:40


 


Temperature 37.1 C  


 


Heart Rate 63 62 64


 


Respiratory 18 16 16





Rate   


 


Blood Pressure 137/90 H 138/86 H 142/80 H


 


O2 Saturation 94 95 








                                     Oxygen











O2 Source                      Room air

















PD MEDICAL DECISION MAKING





- ED course


ED course: 


75-year-old man presents with chronic dysphagia for the past 6 months with 

evidence of esophageal scarring and tortuosity at the base of the esophagus on 

EGD done by Dr. Ragland.  The patient initially was demanding that he be given 

surgery today in order to resolve the problem. I spoke with Dr. Palacio, surgeon 

on call today and he is recommending that we send him to [place] to speak with a

gastroenterology specialist.  after speaking with the patient at length he 

understands that it is important to have a preoperative appointment at the  

[facility] by a gastroenterologist/specialist who can review his case prior 

to deciding to do surgery.





11:50 AMas patient was being given his discharge paperwork he stated that he 

feels too overwhelmed to go home and is concerned that he is going to harm 

himself.  Upon interview he states that he has no active plan and has too much 

to live for to kill himself but that he cannot live with the burping and 

dysphagia anymore and needs surgery immediately.  I discussed with him that we 

will be able to schedule his clinic appointment at the [facility] or [facility] and we will have social work see him.  He also 

requested MiraLAX be given in the emergency department, stating that he may feel

better and be able to go home if he feels like he gets his stool cleared.





4pm - patient feeling much better. he would like to go home and arrange follow 

up with his primary doctor for outpatient follow up with GI in [place]. "I 

apologize for the things I was saying about being suicidal.  That is not true". 

Patient assures me that he feels safe to go home and that he does not have 

concerns that he is going to take his own life.  He was able to have a good 

bowel movement after MiraLAX.  Strict return precautions given.





Departure





- Departure


Disposition: 01 Home, Self Care


Clinical Impression: 


 Dysphagia, GERD (gastroesophageal reflux disease), Chronic scarring of 

esophagus, Constipation





Condition: Good


Instructions:  GERD Dc


Prescriptions: 


polyethylene glycoL 3350 [Miralax] 17 gm PO DAILY #238 gm


Comments: 


You are seen in the emergency department for chronic difficulty swallowing 

liquids and solids and for burping.  I reviewed your medical record and 

discussed with our general surgeon and we are recommending that you follow-up 

with [facility] gastroenterology in [place] or [facility] 

gastroenterology in [place].  They may be able to help you with your anatomic 

issues in your esophagus.  Please take MiraLAX for your constipation as needed. 

 Return to the emergency department if you develop any new or worsening symptoms

 or other concerns.  Follow-up with your primary doctor this week as well as 

with Dr. Ragland.








[email or web address]


[email or web address]


Briana Vidal MD, MS


[facility]


[address] [place] [address]  ~42.9 mi


[phone]











[facility] Department of gastroenterology


[phone] Gastroenterology and Hepatology


Discharge Date/Time: 03/24/21 16:50

## 2021-04-13 ENCOUNTER — HOSPITAL ENCOUNTER (EMERGENCY)
Dept: HOSPITAL 76 - ED | Age: 76
LOS: 1 days | Discharge: PSYCHIATRIC OR SUBSTANCE USE FACILITY | End: 2021-04-14
Payer: MEDICARE

## 2021-04-13 DIAGNOSIS — F43.10: ICD-10-CM

## 2021-04-13 DIAGNOSIS — Z20.822: ICD-10-CM

## 2021-04-13 DIAGNOSIS — R45.851: Primary | ICD-10-CM

## 2021-04-13 DIAGNOSIS — K21.9: ICD-10-CM
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
## 2021-04-13 DIAGNOSIS — K20.90: ICD-10-CM

## 2021-04-13 DIAGNOSIS — K22.8: ICD-10-CM

## 2021-04-13 DIAGNOSIS — F32.9: ICD-10-CM

## 2021-04-13 LAB
ALBUMIN DIAFP-MCNC: 3.7 G/DL (ref 3.2–5.5)
ALBUMIN/GLOB SERPL: 1.2 {RATIO} (ref 1–2.2)
ALP SERPL-CCNC: 64 IU/L (ref 42–121)
ALT SERPL W P-5'-P-CCNC: 15 IU/L (ref 10–60)
AMPHET UR QL SCN: NEGATIVE
ANION GAP SERPL CALCULATED.4IONS-SCNC: 6 MMOL/L (ref 6–13)
APAP SERPL-MCNC: < 10 UG/ML (ref 10–30)
AST SERPL W P-5'-P-CCNC: 22 IU/L (ref 10–42)
B PARAPERT DNA SPEC QL NAA+PROBE: NOT DETECTED
B PERT DNA SPEC QL NAA+PROBE: NOT DETECTED
BARBITURATES UR QL SCN>300 NG/ML: NEGATIVE
BASOPHILS NFR BLD AUTO: 0.1 10^3/UL (ref 0–0.1)
BASOPHILS NFR BLD AUTO: 1 %
BENZODIAZ UR QL SCN: NEGATIVE
BILIRUB BLD-MCNC: 1.1 MG/DL (ref 0.2–1)
BUN SERPL-MCNC: 13 MG/DL (ref 6–20)
C PNEUM DNA NPH QL NAA+NON-PROBE: NOT DETECTED
CALCIUM UR-MCNC: 9.2 MG/DL (ref 8.5–10.3)
CHLORIDE SERPL-SCNC: 99 MMOL/L (ref 101–111)
CLARITY UR REFRACT.AUTO: CLEAR
CO2 SERPL-SCNC: 28 MMOL/L (ref 21–32)
COCAINE UR-SCNC: NEGATIVE UMOL/L
CREAT SERPLBLD-SCNC: 1 MG/DL (ref 0.6–1.2)
EOSINOPHIL # BLD AUTO: 0.2 10^3/UL (ref 0–0.7)
EOSINOPHIL NFR BLD AUTO: 2.3 %
ERYTHROCYTE [DISTWIDTH] IN BLOOD BY AUTOMATED COUNT: 12.6 % (ref 12–15)
ETHANOL BLD-MCNC: < 5 MG/DL
FLUAV RNA RESP QL NAA+PROBE: NOT DETECTED
GFRSERPLBLD MDRD-ARVRAT: 73 ML/MIN/{1.73_M2} (ref 89–?)
GLOBULIN SER-MCNC: 3.1 G/DL (ref 2.1–4.2)
GLUCOSE SERPL-MCNC: 92 MG/DL (ref 70–100)
GLUCOSE UR QL STRIP.AUTO: NEGATIVE MG/DL
HAEM INFLU B DNA SPEC QL NAA+PROBE: NOT DETECTED
HCOV 229E RNA SPEC QL NAA+PROBE: NOT DETECTED
HCOV HKU1 RNA UPPER RESP QL NAA+PROBE: NOT DETECTED
HCOV NL63 RNA ASPIRATE QL NAA+PROBE: NOT DETECTED
HCOV OC43 RNA SPEC QL NAA+PROBE: NOT DETECTED
HCT VFR BLD AUTO: 44.4 % (ref 42–52)
HGB UR QL STRIP: 15.3 G/DL (ref 14–18)
HMPV AG SPEC QL: NOT DETECTED
HPIV1 RNA NPH QL NAA+PROBE: NOT DETECTED
HPIV2 SPEC QL CULT: NOT DETECTED
HPIV3 AB TITR SER CF: NOT DETECTED {TITER}
HPIV4 RNA SPEC QL NAA+PROBE: NOT DETECTED
KETONES UR QL STRIP.AUTO: NEGATIVE MG/DL
LIPASE SERPL-CCNC: 30 U/L (ref 22–51)
LYMPHOCYTES # SPEC AUTO: 1.2 10^3/UL (ref 1.5–3.5)
LYMPHOCYTES NFR BLD AUTO: 14 %
M PNEUMO DNA SPEC QL NAA+PROBE: NOT DETECTED
MCH RBC QN AUTO: 31.4 PG (ref 27–31)
MCHC RBC AUTO-ENTMCNC: 34.5 G/DL (ref 32–36)
MCV RBC AUTO: 91 FL (ref 80–94)
METHADONE UR QL SCN: NEGATIVE
METHAMPHET UR QL SCN: NEGATIVE
MONOCYTES # BLD AUTO: 0.8 10^3/UL (ref 0–1)
MONOCYTES NFR BLD AUTO: 9.6 %
NEUTROPHILS # BLD AUTO: 6.3 10^3/UL (ref 1.5–6.6)
NEUTROPHILS # SNV AUTO: 8.7 X10^3/UL (ref 4.8–10.8)
NEUTROPHILS NFR BLD AUTO: 72.8 %
NITRITE UR QL STRIP.AUTO: NEGATIVE
NRBC # BLD AUTO: 0 /100WBC
NRBC # BLD AUTO: 0 X10^3/UL
OPIATES UR QL SCN: NEGATIVE
PDW BLD AUTO: 9 FL (ref 7.4–11.4)
PH UR STRIP.AUTO: 7.5 PH (ref 5–7.5)
PLATELET # BLD: 203 10^3/UL (ref 130–450)
POTASSIUM SERPL-SCNC: 4 MMOL/L (ref 3.5–5)
PROT SPEC-MCNC: 6.8 G/DL (ref 6.7–8.2)
PROT UR STRIP.AUTO-MCNC: NEGATIVE MG/DL
RBC # UR STRIP.AUTO: (no result) /UL
RBC MAR: 4.88 10^6/UL (ref 4.7–6.1)
RSV RNA RESP QL NAA+PROBE: NOT DETECTED
RV+EV RNA SPEC QL NAA+PROBE: NOT DETECTED
SALICYLATES SERPL-MCNC: < 6 MG/DL
SARS-COV-2 RNA PNL SPEC NAA+PROBE: NOT DETECTED
SODIUM SERPLBLD-SCNC: 133 MMOL/L (ref 135–145)
SP GR UR STRIP.AUTO: 1.02 (ref 1–1.03)
THC UR QL SCN: NEGATIVE
TSH SERPL-ACNC: 1.31 UIU/ML (ref 0.34–5.6)
UROBILINOGEN UR QL STRIP.AUTO: (no result) E.U./DL
UROBILINOGEN UR STRIP.AUTO-MCNC: NEGATIVE MG/DL
VIT B12 SERPL-MCNC: 385 PG/ML (ref 180–914)
VOLATILE DRUGS POS SERPL SCN: (no result)

## 2021-04-13 PROCEDURE — 84443 ASSAY THYROID STIM HORMONE: CPT

## 2021-04-13 PROCEDURE — 96361 HYDRATE IV INFUSION ADD-ON: CPT

## 2021-04-13 PROCEDURE — 80320 DRUG SCREEN QUANTALCOHOLS: CPT

## 2021-04-13 PROCEDURE — 81001 URINALYSIS AUTO W/SCOPE: CPT

## 2021-04-13 PROCEDURE — 87086 URINE CULTURE/COLONY COUNT: CPT

## 2021-04-13 PROCEDURE — 0202U NFCT DS 22 TRGT SARS-COV-2: CPT

## 2021-04-13 PROCEDURE — 80306 DRUG TEST PRSMV INSTRMNT: CPT

## 2021-04-13 PROCEDURE — 99285 EMERGENCY DEPT VISIT HI MDM: CPT

## 2021-04-13 PROCEDURE — 93005 ELECTROCARDIOGRAM TRACING: CPT

## 2021-04-13 PROCEDURE — 99283 EMERGENCY DEPT VISIT LOW MDM: CPT

## 2021-04-13 PROCEDURE — 87631 RESP VIRUS 3-5 TARGETS: CPT

## 2021-04-13 PROCEDURE — 82607 VITAMIN B-12: CPT

## 2021-04-13 PROCEDURE — 85025 COMPLETE CBC W/AUTO DIFF WBC: CPT

## 2021-04-13 PROCEDURE — 80329 ANALGESICS NON-OPIOID 1 OR 2: CPT

## 2021-04-13 PROCEDURE — 80053 COMPREHEN METABOLIC PANEL: CPT

## 2021-04-13 PROCEDURE — 96360 HYDRATION IV INFUSION INIT: CPT

## 2021-04-13 PROCEDURE — 81003 URINALYSIS AUTO W/O SCOPE: CPT

## 2021-04-13 PROCEDURE — 83690 ASSAY OF LIPASE: CPT

## 2021-04-13 PROCEDURE — 80307 DRUG TEST PRSMV CHEM ANLYZR: CPT

## 2021-04-13 PROCEDURE — 36415 COLL VENOUS BLD VENIPUNCTURE: CPT

## 2021-04-13 NOTE — EXTERNAL MEDICAL SUMMARY RPT
Continuity of Care Document

                            Created on:2021



Patient:KIERA SARAH

Sex:Male

:1945

External Reference #:[id number]





Demographics







                          Phone                     Unavailable

 

                          Preferred Language        English

 

                          Marital Status            Unknown

 

                          Evangelical Affiliation     Unknown

 

                          Race                      Unknown

 

                          Ethnic Group              Unknown









Author







                          Organization              [facility]

 

                          Address                    [place] [id number]

 

                          Phone                     [phone]









Social History







                     date                description         facility

 

                     [id number]
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
## 2021-04-13 NOTE — EXTERNAL MEDICAL SUMMARY RPT
Continuity of Care Document

                            Created on:2021



Patient:KIERA SARAH

Sex:Male

:1945

External Reference #:[id number]





Demographics







                          Phone                     Unavailable

 

                          Preferred Language        English

 

                          Marital Status            Unknown

 

                          Alevism Affiliation     Unknown

 

                          Race                      Unknown

 

                          Ethnic Group              Unknown









Author







                          Organization              [facility]

 

                          Address                    [place] [id number]

 

                          Phone                     [phone]









Social History







                     date                description         facility

 

                     [phone]

## 2021-04-14 VITALS — SYSTOLIC BLOOD PRESSURE: 109 MMHG | DIASTOLIC BLOOD PRESSURE: 78 MMHG

## 2021-04-27 ENCOUNTER — HOSPITAL ENCOUNTER (EMERGENCY)
Dept: HOSPITAL 76 - ED | Age: 76
Discharge: HOME | End: 2021-04-27
Payer: MEDICARE

## 2021-04-27 VITALS — DIASTOLIC BLOOD PRESSURE: 72 MMHG | SYSTOLIC BLOOD PRESSURE: 111 MMHG

## 2021-04-27 DIAGNOSIS — N20.1: Primary | ICD-10-CM

## 2021-04-27 DIAGNOSIS — I10: ICD-10-CM

## 2021-04-27 LAB
ALBUMIN DIAFP-MCNC: 4 G/DL (ref 3.2–5.5)
ALBUMIN/GLOB SERPL: 1.3 {RATIO} (ref 1–2.2)
ALP SERPL-CCNC: 62 IU/L (ref 42–121)
ALT SERPL W P-5'-P-CCNC: 14 IU/L (ref 10–60)
ANION GAP SERPL CALCULATED.4IONS-SCNC: 8 MMOL/L (ref 6–13)
AST SERPL W P-5'-P-CCNC: 24 IU/L (ref 10–42)
BASOPHILS NFR BLD AUTO: 0.1 10^3/UL (ref 0–0.1)
BASOPHILS NFR BLD AUTO: 0.7 %
BILIRUB BLD-MCNC: 0.7 MG/DL (ref 0.2–1)
BUN SERPL-MCNC: 14 MG/DL (ref 6–20)
CALCIUM UR-MCNC: 9.4 MG/DL (ref 8.5–10.3)
CHLORIDE SERPL-SCNC: 104 MMOL/L (ref 101–111)
CLARITY UR REFRACT.AUTO: CLEAR
CO2 SERPL-SCNC: 27 MMOL/L (ref 21–32)
CREAT SERPLBLD-SCNC: 1.1 MG/DL (ref 0.6–1.2)
EOSINOPHIL # BLD AUTO: 0.3 10^3/UL (ref 0–0.7)
EOSINOPHIL NFR BLD AUTO: 3 %
ERYTHROCYTE [DISTWIDTH] IN BLOOD BY AUTOMATED COUNT: 12.9 % (ref 12–15)
GFRSERPLBLD MDRD-ARVRAT: 65 ML/MIN/{1.73_M2} (ref 89–?)
GLOBULIN SER-MCNC: 3.2 G/DL (ref 2.1–4.2)
GLUCOSE SERPL-MCNC: 103 MG/DL (ref 70–100)
GLUCOSE UR QL STRIP.AUTO: NEGATIVE MG/DL
HCT VFR BLD AUTO: 43.3 % (ref 42–52)
HGB UR QL STRIP: 15.1 G/DL (ref 14–18)
KETONES UR QL STRIP.AUTO: (no result) MG/DL
LIPASE SERPL-CCNC: 29 U/L (ref 22–51)
LYMPHOCYTES # SPEC AUTO: 1.5 10^3/UL (ref 1.5–3.5)
LYMPHOCYTES NFR BLD AUTO: 15.8 %
MCH RBC QN AUTO: 32.4 PG (ref 27–31)
MCHC RBC AUTO-ENTMCNC: 34.9 G/DL (ref 32–36)
MCV RBC AUTO: 92.9 FL (ref 80–94)
MONOCYTES # BLD AUTO: 0.8 10^3/UL (ref 0–1)
MONOCYTES NFR BLD AUTO: 8.7 %
MUCOUS THREADS URNS QL MICRO: (no result)
NEUTROPHILS # BLD AUTO: 6.9 10^3/UL (ref 1.5–6.6)
NEUTROPHILS # SNV AUTO: 9.6 X10^3/UL (ref 4.8–10.8)
NEUTROPHILS NFR BLD AUTO: 71.6 %
NITRITE UR QL STRIP.AUTO: NEGATIVE
NRBC # BLD AUTO: 0 /100WBC
NRBC # BLD AUTO: 0 X10^3/UL
PDW BLD AUTO: 9.7 FL (ref 7.4–11.4)
PH UR STRIP.AUTO: 6.5 PH (ref 5–7.5)
PLATELET # BLD: 157 10^3/UL (ref 130–450)
POTASSIUM SERPL-SCNC: 3.9 MMOL/L (ref 3.5–5)
PROT SPEC-MCNC: 7.2 G/DL (ref 6.7–8.2)
PROT UR STRIP.AUTO-MCNC: NEGATIVE MG/DL
RBC # UR STRIP.AUTO: (no result) /UL
RBC # URNS HPF: (no result) /HPF (ref 0–5)
RBC MAR: 4.66 10^6/UL (ref 4.7–6.1)
SODIUM SERPLBLD-SCNC: 139 MMOL/L (ref 135–145)
SP GR UR STRIP.AUTO: 1.02 (ref 1–1.03)
SQUAMOUS URNS QL MICRO: (no result)
UROBILINOGEN UR QL STRIP.AUTO: (no result) E.U./DL
UROBILINOGEN UR STRIP.AUTO-MCNC: NEGATIVE MG/DL
WBC # UR MANUAL: (no result) /HPF (ref 0–3)

## 2021-04-27 PROCEDURE — 85025 COMPLETE CBC W/AUTO DIFF WBC: CPT

## 2021-04-27 PROCEDURE — 81003 URINALYSIS AUTO W/O SCOPE: CPT

## 2021-04-27 PROCEDURE — 83690 ASSAY OF LIPASE: CPT

## 2021-04-27 PROCEDURE — 87086 URINE CULTURE/COLONY COUNT: CPT

## 2021-04-27 PROCEDURE — 74177 CT ABD & PELVIS W/CONTRAST: CPT

## 2021-04-27 PROCEDURE — 99284 EMERGENCY DEPT VISIT MOD MDM: CPT

## 2021-04-27 PROCEDURE — 81001 URINALYSIS AUTO W/SCOPE: CPT

## 2021-04-27 PROCEDURE — 80053 COMPREHEN METABOLIC PANEL: CPT

## 2021-04-27 PROCEDURE — 36415 COLL VENOUS BLD VENIPUNCTURE: CPT

## 2021-04-27 NOTE — EXTERNAL MEDICAL SUMMARY RPT
Continuity of Care Document

                            Created on:2021



Patient:KIERA SARAH

Sex:Male

:1945

External Reference #:[id number]





Demographics







                          Phone                     Unavailable

 

                          Preferred Language        English

 

                          Marital Status            Unknown

 

                          Confucianism Affiliation     Unknown

 

                          Race                      Unknown

 

                          Ethnic Group              Unknown









Author







                          Organization              [facility]

 

                          Address                    [place] [id number]

 

                          Phone                     [phone]









Social History







                     date                description         facility

 

                     [phone]

## 2021-04-27 NOTE — CT REPORT
PROCEDURE:  Abdomen/Pelvis W

 

INDICATIONS:  hematuria; dysuria

 

CONTRAST:  IV CONTRAST: Isovue 300 ml: 100 PO CONTRAST: *NO PO CONTRAST 

 

TECHNIQUE:  

After the administration of IV contrast, 5 mm thick sections acquired from the diaphragms to the symp
hysis.  5 mm thick coronal and sagittal reformats were acquired.  For radiation dose reduction, the f
ollowing was used:  automated exposure control, adjustment of mA and/or kV according to patient size.
  

 

COMPARISON:  None.

 

FINDINGS:  

ABDOMEN:

 

Lung bases: Normal

Liver: Hepatic steatosis.

Gallbladder: Age-indeterminate gallbladder wall thickening, suboptimally evaluated given partially de
compressed state

Bile ducts: Normal

Pancreas: Normal

Spleen: Normal

Adrenals: Normal.

Kidneys: Bilateral renal cortical atrophy and scarring. Simple appearing left renal cyst measuring 4 
cm. Additional left renal cysts. The ureters appear decompressed.

Stomach: Normal.  

Appendix: Normal.

Colonic diverticulosis incidentally noted without evidence of acute inflammation.

 

Other: No free fluid or air. 

Abdominal nodes: Normal

Aorta: Normal.

Scalp

IVC: Normal. 

Ventral wall: Normal. 

 

PELVIS:

 

Bladder: 4 mm calculus seen in the region of the right ureterovesical junction. Possible bladder wall
 thickening although limited evaluation given decompressed state..

Pelvic nodes: Normal.

Inguinal: No hernia.  

Bones: Diffuse spondylitic changes and facet arthropathy. No compression fracture

 

 

IMPRESSION:

4 mm calculus seen at the right ureterovesical junction. No additional urolithiasis identified. Quest
ionable bladder wall thickening. Recommend clinical correlation and if necessary, cystoscopic evaluat
ion could be considered

 

 

 

Reviewed by: Chip Stoner MD on 4/27/2021 10:06 PM PDT

Approved by: Chip Stoner MD on 4/27/2021 10:06 PM PDT

 

 

Station ID:  [id number]

## 2021-04-27 NOTE — EXTERNAL MEDICAL SUMMARY RPT
Continuity of Care Document

                            Created on:2021



Patient:KIERA SARAH

Sex:Male

:1945

External Reference #:[id number]





Demographics







                          Phone                     Unavailable

 

                          Preferred Language        English

 

                          Marital Status            Unknown

 

                          Latter day Affiliation     Unknown

 

                          Race                      Unknown

 

                          Ethnic Group              Unknown









Author







                          Organization              [facility]

 

                          Address                    [place] [id number]

 

                          Phone                     [phone]









Social History







                     date                description         facility

 

                     [phone]

## 2021-04-27 NOTE — ED PHYSICIAN DOCUMENTATION
History of Present Illness





- Stated complaint


Stated Complaint: ABD PX/MALE 





- Chief complaint


Chief Complaint: Abd Pain





- Additonal information


Additional information: 


75-year-old male presents the emergency department for evaluation of lower 

abdominal discomfort and dysuria that began this morning.  He reports that he 

had pain when he pees but did not notice any abnormal coloration of the urine or

foul odor.  Pain lasted for about 2 to 3 hours.  He decided to come to the ER to

get checked out.  By the time he arrived here he reports the pain had gone away 

and he was able to urinate pain-free.  He denies any previous history of renal 

colic.  No history of urinary tract infection.





Patient denies fevers or vomiting.








Review of Systems


Constitutional: denies: Fever, Chills


Eyes: reports: Reviewed and negative


Ears: reports: Reviewed and negative


Nose: reports: Reviewed and negative


Throat: reports: Reviewed and negative


Cardiac: reports: Reviewed and negative


Respiratory: reports: Reviewed and negative


GI: reports: Abdominal Pain.  denies: Nausea, Vomiting


: reports: Dysuria.  denies: Frequency, Hesitancy, Hematuria


Skin: reports: Reviewed and negative


Musculoskeletal: reports: Reviewed and negative





PD PAST MEDICAL HISTORY





- Past Medical History


Past Medical History: Yes


Cardiovascular: Hypertension, High cholesterol, MI


Respiratory: None


Neuro: None


Endocrine/Autoimmune: None


GI: GERD


: None


HEENT: None


Psych: Depression, Anxiety, Panic attacks, Post traumatic stress disorder


Musculoskeletal: None


Derm: None





- Past Surgical History


Past Surgical History: Yes


General: Other


Cardiovascular: Coronary stent





- Present Medications


Home Medications: 


                                Ambulatory Orders











 Medication  Instructions  Recorded  Confirmed


 


Atorvastatin Calcium 80 mg PO DAILY 03/04/16 12/27/20


 


Calcium Citrate/Vitamin D3 [[facility] 1 tab PO BID 03/04/16 12/27/20





Calcium Citrate-Vit D Tab]   


 


Citalopram [CeleXA] 40 mg PO DAILY 03/04/16 12/27/20


 


Levetiracetam [Keppra] 750 mg PO BID 03/04/16 04/27/21


 


Metoprolol Tartrate 12.5 mg PO BID 03/04/16 04/27/21


 


Omeprazole 20 mg PO DAILY 03/04/16 04/27/21


 


lisinopriL [Lisinopril] 5 mg PO DAILY 03/04/16 04/27/21


 


Ibuprofen [Motrin] 600 mg PO Q6H PRN 12/27/20 04/27/21


 


Sodium Fluoride [Prevident 5000] 1 applic TOP DAILY PM 12/27/20 04/27/21


 


Lansoprazole [Prevacid] 0 mg DAILY 03/24/21 04/27/21


 


Tamsulosin HCl [Flomax] 0.4 mg PO DAILY #7 04/27/21 














- Allergies


Allergies/Adverse Reactions: 


                                    Allergies











Allergy/AdvReac Type Severity Reaction Status Date / Time


 


fluoxetine AdvReac  Anxiety Verified 04/27/21 19:35


 


oxycodone HCl * AdvReac  Hallucinati Verified 04/27/21 19:35





[From OxyContin]   ons  














- Social History


Does the pt smoke?: No


Smoking Status: Never smoker


Does the pt drink ETOH?: No


Does the pt have substance abuse?: Yes





- Immunizations


Immunizations are current?: Yes





- POLST


Patient has POLST: No





PD ED PE EXPANDED





- General


General: Alert, No acute distress, Well developed/nourished





- Cardiac


Cardiac: Regular Rate, Murmur Present, Radial strong equal, Pedal strong equal, 

Cap refill < 2 sec





- Respiratory


Respiratory: Clear to ausultation shara.  No: Distress, Labored





- Abdomen


Abdomen: Normal Bowel sounds.  No: Tender to palpation





- Back


Back: No: CVA TTP right, CVA TTP left





- Derm


Derm: Normal color, Warm and dry





- Extremities


Extremities: Normal.  No: Deformity, Tenderness





- Neuro


Neuro: Alert and Oriented X 3, CNII-XII intact





- GCS


Eye Opening: Spontaneous


Motor: Obeys Commands


Verbal: Oriented


Total: 15





Results





- Vitals


Vitals: 





                               Vital Signs - 24 hr











  04/27/21





  19:34


 


Temperature 37.0 C


 


Heart Rate 67


 


Respiratory 17





Rate 


 


Blood Pressure 111/72


 


O2 Saturation 98








                                     Oxygen











O2 Source                      Room air

















- Labs


Labs: 





                                Laboratory Tests











  04/27/21 04/27/21 04/27/21





  19:36 21:15 21:15


 


WBC   9.6 


 


RBC   4.66 L 


 


Hgb   15.1 


 


Hct   43.3 


 


MCV   92.9 


 


MCH   32.4 H 


 


MCHC   34.9 


 


RDW   12.9 


 


Plt Count   157 


 


MPV   9.7 


 


Neut # (Auto)   6.9 H 


 


Lymph # (Auto)   1.5 


 


Mono # (Auto)   0.8 


 


Eos # (Auto)   0.3 


 


Baso # (Auto)   0.1 


 


Absolute Nucleated RBC   0.00 


 


Nucleated RBC %   0.0 


 


Sodium    139


 


Potassium    3.9


 


Chloride    104


 


Carbon Dioxide    27


 


Anion Gap    8.0


 


BUN    14


 


Creatinine    1.1


 


Estimated GFR (MDRD)    65 L


 


Glucose    103 H


 


Calcium    9.4


 


Total Bilirubin    0.7


 


AST    24


 


ALT    14


 


Alkaline Phosphatase    62


 


Total Protein    7.2


 


Albumin    4.0


 


Globulin    3.2


 


Albumin/Globulin Ratio    1.3


 


Lipase    29


 


Urine Color  YELLOW  


 


Urine Clarity  CLEAR  


 


Urine pH  6.5  


 


Ur Specific Gravity  1.025  


 


Urine Protein  NEGATIVE  


 


Urine Glucose (UA)  NEGATIVE  


 


Urine Ketones  TRACE  


 


Urine Occult Blood  LARGE H  


 


Urine Nitrite  NEGATIVE  


 


Urine Bilirubin  NEGATIVE  


 


Urine Urobilinogen  1 (NORMAL)  


 


Ur Leukocyte Esterase  NEGATIVE  


 


Urine RBC  TNTC H  


 


Urine WBC  0-3  


 


Ur Squamous Epith Cells  FEW Squamous  


 


Urine Bacteria  None Seen  


 


Urine Mucus  Few Strands  


 


Ur Microscopic Review  INDICATED  


 


Urine Culture Comments  NOT INDICATED  














- Rads (name of study)


  ** CT abd


Radiology: Final report received (4 mm calculus seen at the right UVJ.  No 

additional urolithiasis identified.)





PD MEDICAL DECISION MAKING





- ED course


Complexity details: reviewed results, re-evaluated patient, d/w patient


ED course: 





This is a well-appearing 75-year-old male that presents to the emergency 

department for evaluation of dysuria and lower abdominal pain that lasted a few 

hours before coming to the emergency department.  By the time he arrived here 

the pain and dysuria had fully abated.  Screening labs show no significant 

leukocytosis or electrolyte abnormality.  His urine is positive for hematuria.  

However no secondary signs of infection.  He did not have any abdominal or flank

tenderness on exam.  There is no CVA tenderness.  A CT of the abdomen does show 

a 4 mm calculus at the right UVJ.  No associated hydroureter or hydronephrosis.





These findings were discussed with the patient.  He will be started on Flomax 

and advised close follow-up with his primary care provider as well as urology.  

Emergent return precautions were discussed for fevers, worsening symptoms, 

uncontrolled vomiting.





Departure





- Departure


Disposition: 01 Home, Self Care


Clinical Impression: 


 Calculus of ureterovesical junction (UVJ)





Hematuria


Qualifiers:


 Hematuria type: gross Qualified Code(s): R31.0 - Gross hematuria





Condition: Stable


Record reviewed to determine appropriate education?: Yes


Instructions:  Kidney Stones Tx Meds, ED Stone Renal W Colic


Follow-Up: 


Ross Mitchell MD [Primary Care Provider] - 


Prescriptions: 


Tamsulosin HCl [Flomax] 0.4 mg PO DAILY #7


Comments: 


Edward you were seen in the emergency department today for pain with urination 

as well as some lower abdominal pain.  However by the time of my evaluation you 

were free of those symptoms.  As we discussed your urine showed a moderate 

amount of blood.  We did do a CT scan and you do have a 4 mm stone at your UVJ 

near your bladder.  Reassuringly there is no infection in your urine and there 

is no swelling in the kidney or ureter.  I have prescribed a medication called 

Flomax.  Please take this every day for the next week.  This should help dilate 

the ureter and allow the small stone to pass.





You are having increased pain uncontrolled vomiting then please return 

immediately to the ER.





You do need to be seen by a urologist.  Please ask your primary care provider to

 make that referral.

## 2021-06-21 ENCOUNTER — HOSPITAL ENCOUNTER (OUTPATIENT)
Dept: HOSPITAL 76 - SDS | Age: 76
Discharge: HOME | End: 2021-06-21
Attending: SURGERY
Payer: OTHER GOVERNMENT

## 2021-06-21 VITALS — SYSTOLIC BLOOD PRESSURE: 136 MMHG | DIASTOLIC BLOOD PRESSURE: 82 MMHG

## 2021-06-21 DIAGNOSIS — K64.8: ICD-10-CM

## 2021-06-21 DIAGNOSIS — Z79.899: ICD-10-CM

## 2021-06-21 DIAGNOSIS — F41.9: ICD-10-CM

## 2021-06-21 DIAGNOSIS — Z12.11: Primary | ICD-10-CM

## 2021-06-21 DIAGNOSIS — Z87.891: ICD-10-CM

## 2021-06-21 DIAGNOSIS — Z92.83: ICD-10-CM

## 2021-06-21 PROCEDURE — 45378 DIAGNOSTIC COLONOSCOPY: CPT

## 2021-06-21 NOTE — ANESTHESIA POST OP EVALUATION
Anesthesia Post Eval





- Post Anesthesia Eval


Vitals: 





                                Last Vital Signs











Temp  37.1 C   06/21/21 11:24


 


Pulse  57 L  06/21/21 11:49


 


Resp  18   06/21/21 11:49


 


BP  136/82 H  06/21/21 11:49


 


Pulse Ox  97   06/21/21 11:49











CV Function Including HR & BP: Stable


Pain Control: Satisfactory


Nausea & Vomiting: Negative


Mental Status: Baseline


Respiratory Status: Airway Patent


Hydration Status: Satisfactory


Anesthesia Complications: None

## 2021-06-21 NOTE — ANESTHESIA
Pre-Anesthesia VS, & Labs





- Diagnosis





Screening exam





- Procedure





colonoscopy


Vital Signs: 





                                        











Temp Pulse Resp BP Pulse Ox


 


 36.1 C L  64   16   141/85 H  98 


 


 06/21/21 08:05  06/21/21 08:05  06/21/21 08:05  06/21/21 08:05  06/21/21 08:05











Height: 6 ft 1 in


Weight (kg): 81.1 kg


Body Mass Index: 23.6


BMI Classification: Healthy weight





- NPO


>8 hours





Home Medications and Allergies





                                        





Atorvastatin Calcium 80 mg PO DAILY 03/04/16 


Calcium Citrate/Vitamin D3 [[facility] Calcium Citrate-Vit D Tab] 1 tab PO BID 03/04/16




Citalopram [CeleXA] 40 mg PO DAILY 03/04/16 


Levetiracetam [Keppra] 750 mg PO BID 03/04/16 


Metoprolol Tartrate 12.5 mg PO BID 03/04/16 


Omeprazole 20 mg PO DAILY 03/04/16 


lisinopriL [Lisinopril] 5 mg PO DAILY 03/04/16 


Ibuprofen [Motrin] 600 mg PO Q6H PRN 12/27/20 


Sodium Fluoride [Prevident 5000] 1 applic TOP DAILY PM 12/27/20 


Lansoprazole [Prevacid] 0 mg DAILY 03/24/21 








Allergies/Adverse Reactions: 


                                    Allergies











Allergy/AdvReac Type Severity Reaction Status Date / Time


 


fluoxetine AdvReac  Anxiety Verified 04/27/21 19:35


 


oxycodone HCl * AdvReac  Hallucinati Verified 04/27/21 19:35





[From OxyContin]   ons  














Anes History & Medical History





- Anesthetic History


Family history of Anesthesia Complications: Denies


Family history of Malignant Hyperthermia: Denies





- Medical History


Cardiovascular: reports: Hypertension, High cholesterol, MI


Pulmonary: reports: None


Gastrointestinal: reports: GERD (well controlled)


Urinary: reports: None


Neuro: reports: Seizure disorder


Musculoskeletal: reports: None


Endocrine/Autoimmune: reports: None


Blood Disorders: reports: None


Skin: reports: None


Smoking Status: Former smoker (quit 20 years ago)


Psychosocial: reports: Alcohol (sober since 1987), Other (PTSD)


History of Cancer?: No





- Surgical History


General: reports: EGD, Other


Cardiothoracic: reports: Coronary stent





Exam


General: Alert, Oriented x3, Cooperative, No acute distress


Dental: Poor dentition


Mouth Opening: 3 Fingerbreadth


Neck Mobility: Normal


Mallampati classification: III


Thyromental Distance: 4-6 cm


Mental/Cognitive Status: Alert/Oriented X3, Normal for patient





Plan


Anesthesia Type: Total IV


Consent for Procedure(s) Verified and Reviewed: Yes


Code Status: Attempt Resuscitation


ASA classification: 3-Severe systemic disease


Is this case an emergency?: No

## 2021-10-20 ENCOUNTER — HOSPITAL ENCOUNTER (EMERGENCY)
Dept: HOSPITAL 76 - ED | Age: 76
LOS: 1 days | Discharge: PSYCHIATRIC OR SUBSTANCE USE FACILITY | End: 2021-10-21
Payer: OTHER GOVERNMENT

## 2021-10-20 DIAGNOSIS — I10: ICD-10-CM

## 2021-10-20 DIAGNOSIS — F32.9: Primary | ICD-10-CM

## 2021-10-20 DIAGNOSIS — Z20.822: ICD-10-CM

## 2021-10-20 DIAGNOSIS — R45.851: ICD-10-CM

## 2021-10-20 DIAGNOSIS — Z87.891: ICD-10-CM

## 2021-10-20 LAB
ALBUMIN DIAFP-MCNC: 3.6 G/DL (ref 3.2–5.5)
ALBUMIN/GLOB SERPL: 1.2 {RATIO} (ref 1–2.2)
ALP SERPL-CCNC: 55 IU/L (ref 42–121)
ALT SERPL W P-5'-P-CCNC: 13 IU/L (ref 10–60)
AMPHET UR QL SCN: NEGATIVE
ANION GAP SERPL CALCULATED.4IONS-SCNC: 8 MMOL/L (ref 6–13)
APAP SERPL-MCNC: < 10 UG/ML (ref 10–30)
AST SERPL W P-5'-P-CCNC: 20 IU/L (ref 10–42)
B PARAPERT DNA SPEC QL NAA+PROBE: NOT DETECTED
B PERT DNA SPEC QL NAA+PROBE: NOT DETECTED
BARBITURATES UR QL SCN>300 NG/ML: NEGATIVE
BASOPHILS NFR BLD AUTO: 0 10^3/UL (ref 0–0.1)
BASOPHILS NFR BLD AUTO: 0.4 %
BENZODIAZ UR QL SCN: NEGATIVE
BILIRUB BLD-MCNC: 0.9 MG/DL (ref 0.2–1)
BUN SERPL-MCNC: 14 MG/DL (ref 6–20)
C PNEUM DNA NPH QL NAA+NON-PROBE: NOT DETECTED
CALCIUM UR-MCNC: 8.9 MG/DL (ref 8.5–10.3)
CHLORIDE SERPL-SCNC: 102 MMOL/L (ref 101–111)
CLARITY UR REFRACT.AUTO: CLEAR
CO2 SERPL-SCNC: 28 MMOL/L (ref 21–32)
COCAINE UR-SCNC: NEGATIVE UMOL/L
CREAT SERPLBLD-SCNC: 1 MG/DL (ref 0.6–1.2)
EOSINOPHIL # BLD AUTO: 0.1 10^3/UL (ref 0–0.7)
EOSINOPHIL NFR BLD AUTO: 1.3 %
ERYTHROCYTE [DISTWIDTH] IN BLOOD BY AUTOMATED COUNT: 12 % (ref 12–15)
ETHANOL BLD-MCNC: < 5 MG/DL
FLUAV RNA RESP QL NAA+PROBE: NOT DETECTED
GFRSERPLBLD MDRD-ARVRAT: 73 ML/MIN/{1.73_M2} (ref 89–?)
GLOBULIN SER-MCNC: 3.1 G/DL (ref 2.1–4.2)
GLUCOSE SERPL-MCNC: 89 MG/DL (ref 70–100)
GLUCOSE UR QL STRIP.AUTO: NEGATIVE MG/DL
HAEM INFLU B DNA SPEC QL NAA+PROBE: NOT DETECTED
HCOV 229E RNA SPEC QL NAA+PROBE: NOT DETECTED
HCOV HKU1 RNA UPPER RESP QL NAA+PROBE: NOT DETECTED
HCOV NL63 RNA ASPIRATE QL NAA+PROBE: NOT DETECTED
HCOV OC43 RNA SPEC QL NAA+PROBE: NOT DETECTED
HCT VFR BLD AUTO: 45.6 % (ref 42–52)
HGB UR QL STRIP: 15.5 G/DL (ref 14–18)
HMPV AG SPEC QL: NOT DETECTED
HPIV1 RNA NPH QL NAA+PROBE: NOT DETECTED
HPIV2 SPEC QL CULT: NOT DETECTED
HPIV3 AB TITR SER CF: NOT DETECTED {TITER}
HPIV4 RNA SPEC QL NAA+PROBE: NOT DETECTED
KETONES UR QL STRIP.AUTO: NEGATIVE MG/DL
LIPASE SERPL-CCNC: 30 U/L (ref 22–51)
LYMPHOCYTES # SPEC AUTO: 1.1 10^3/UL (ref 1.5–3.5)
LYMPHOCYTES NFR BLD AUTO: 11.8 %
M PNEUMO DNA SPEC QL NAA+PROBE: NOT DETECTED
MCH RBC QN AUTO: 31.8 PG (ref 27–31)
MCHC RBC AUTO-ENTMCNC: 34 G/DL (ref 32–36)
MCV RBC AUTO: 93.6 FL (ref 80–94)
METHADONE UR QL SCN: NEGATIVE
METHAMPHET UR QL SCN: NEGATIVE
MONOCYTES # BLD AUTO: 0.8 10^3/UL (ref 0–1)
MONOCYTES NFR BLD AUTO: 8.4 %
NEUTROPHILS # BLD AUTO: 7.2 10^3/UL (ref 1.5–6.6)
NEUTROPHILS # SNV AUTO: 9.3 X10^3/UL (ref 4.8–10.8)
NEUTROPHILS NFR BLD AUTO: 77.8 %
NITRITE UR QL STRIP.AUTO: NEGATIVE
NRBC # BLD AUTO: 0 /100WBC
NRBC # BLD AUTO: 0 X10^3/UL
OPIATES UR QL SCN: NEGATIVE
PDW BLD AUTO: 9.5 FL (ref 7.4–11.4)
PH UR STRIP.AUTO: 7.5 PH (ref 5–7.5)
PLATELET # BLD: 144 10^3/UL (ref 130–450)
POTASSIUM SERPL-SCNC: 4 MMOL/L (ref 3.5–5)
PROT SPEC-MCNC: 6.7 G/DL (ref 6.7–8.2)
PROT UR STRIP.AUTO-MCNC: NEGATIVE MG/DL
RBC # UR STRIP.AUTO: NEGATIVE /UL
RBC MAR: 4.87 10^6/UL (ref 4.7–6.1)
RSV RNA RESP QL NAA+PROBE: NOT DETECTED
RV+EV RNA SPEC QL NAA+PROBE: NOT DETECTED
SALICYLATES SERPL-MCNC: < 6 MG/DL
SARS-COV-2 RNA PNL SPEC NAA+PROBE: NOT DETECTED
SODIUM SERPLBLD-SCNC: 138 MMOL/L (ref 135–145)
SP GR UR STRIP.AUTO: 1.02 (ref 1–1.03)
THC UR QL SCN: NEGATIVE
UROBILINOGEN UR QL STRIP.AUTO: (no result) E.U./DL
UROBILINOGEN UR STRIP.AUTO-MCNC: NEGATIVE MG/DL
VOLATILE DRUGS POS SERPL SCN: (no result)

## 2021-10-20 PROCEDURE — 83690 ASSAY OF LIPASE: CPT

## 2021-10-20 PROCEDURE — 80307 DRUG TEST PRSMV CHEM ANLYZR: CPT

## 2021-10-20 PROCEDURE — 99285 EMERGENCY DEPT VISIT HI MDM: CPT

## 2021-10-20 PROCEDURE — 80306 DRUG TEST PRSMV INSTRMNT: CPT

## 2021-10-20 PROCEDURE — 99281 EMR DPT VST MAYX REQ PHY/QHP: CPT

## 2021-10-20 PROCEDURE — 81001 URINALYSIS AUTO W/SCOPE: CPT

## 2021-10-20 PROCEDURE — 81003 URINALYSIS AUTO W/O SCOPE: CPT

## 2021-10-20 PROCEDURE — 0202U NFCT DS 22 TRGT SARS-COV-2: CPT

## 2021-10-20 PROCEDURE — 87086 URINE CULTURE/COLONY COUNT: CPT

## 2021-10-20 PROCEDURE — 80329 ANALGESICS NON-OPIOID 1 OR 2: CPT

## 2021-10-20 PROCEDURE — 85025 COMPLETE CBC W/AUTO DIFF WBC: CPT

## 2021-10-20 PROCEDURE — 36415 COLL VENOUS BLD VENIPUNCTURE: CPT

## 2021-10-20 PROCEDURE — 80320 DRUG SCREEN QUANTALCOHOLS: CPT

## 2021-10-20 PROCEDURE — 80053 COMPREHEN METABOLIC PANEL: CPT

## 2021-10-20 PROCEDURE — 84443 ASSAY THYROID STIM HORMONE: CPT

## 2021-10-20 NOTE — ED PHYSICIAN DOCUMENTATION
PD HPI MHE





- Stated complaint


Stated Complaint: SI





- Chief complaint


Chief Complaint: MHE





- History obtained from


History obtained from: Patient





- History of Present Illness


Primary symptom: Suicidal ideation





- Additional information


Additional information: 





75-year-old male who presents with suicidal ideation.  The patient states over 

the last several days he has had thoughts of suicide and states that he told his

wife, he states his wife is very supportive be transferred to inpatient psych 

and he states that there was some difficulty with transportation therefore he 

was told to come in to the nearest emergency department if he could not wait at 

home any longer.  The patient states that he has plans to slit his wrist, his 

throat and stab himself in the chest with a "very long knife and wiggle it 

around." He has a past history of a psych admission for suicidal ideation in the

past though no prior attempts.  He is not on any antidepressants or antipsy

chotics or other medication.  [facility]

[facility].





Review of Systems


Ten Systems: 10 systems reviewed and negative


Psychiatric: reports: Depressed, Suicidal.  denies: Homicidal, Hallucinations, 

Delusions, Anxiety





PD PAST MEDICAL HISTORY





- Past Medical History


Past Medical History: Yes


Cardiovascular: Hypertension, High cholesterol, MI


Respiratory: None


Neuro: Seizure disorder


Endocrine/Autoimmune: None


GI: GERD (well controlled)


: None


HEENT: None


Psych: Depression, Anxiety, Panic attacks, Post traumatic stress disorder


Musculoskeletal: None


Derm: None





- Past Surgical History


Past Surgical History: Yes


General: Other


Cardiovascular: Coronary stent





- Present Medications


Home Medications: 


                                Ambulatory Orders











 Medication  Instructions  Recorded  Confirmed


 


Atorvastatin Calcium 80 mg PO DAILY 03/04/16 06/10/21


 


Calcium Citrate/Vitamin D3 [[facility] 1 tab PO BID 03/04/16 06/10/21





Calcium Citrate-Vit D Tab]   


 


Citalopram [CeleXA] 40 mg PO DAILY 03/04/16 06/21/21


 


Levetiracetam [Keppra] 750 mg PO BID 03/04/16 06/10/21


 


Metoprolol Tartrate 12.5 mg PO BID 03/04/16 06/10/21


 


Omeprazole 20 mg PO DAILY 03/04/16 06/10/21


 


lisinopriL [Lisinopril] 5 mg PO DAILY 03/04/16 06/10/21


 


Ibuprofen [Motrin] 600 mg PO Q6H PRN 12/27/20 06/10/21


 


Sodium Fluoride [Prevident 5000] 1 applic TOP DAILY PM 12/27/20 06/10/21


 


Lansoprazole [Prevacid] 0 mg DAILY 03/24/21 06/10/21


 


Tamsulosin HCl [Flomax] 0.4 mg PO DAILY #7 04/27/21 06/10/21














- Allergies


Allergies/Adverse Reactions: 


                                    Allergies











Allergy/AdvReac Type Severity Reaction Status Date / Time


 


fluoxetine AdvReac  Anxiety Verified 10/20/21 13:14


 


oxycodone HCl * AdvReac  Hallucinati Verified 10/20/21 13:14





[From OxyContin]   ons  














- Social History


Does the pt smoke?: No


Smoking Status: Former smoker (quit 20 years ago)


Does the pt drink ETOH?: No


Does the pt have substance abuse?: Yes





- Immunizations


Immunizations are current?: Yes





- POLST


Patient has POLST: No





PD ED PE NORMAL





- Vitals


Vital signs reviewed: Yes





- General


General: Alert and oriented X 3, No acute distress, Well developed/nourished





- HEENT


HEENT: Atraumatic, Pharynx benign





- Neck


Neck: Supple, no meningeal sign, No JVD





- Cardiac


Cardiac: RRR, No murmur





- Respiratory


Respiratory: No respiratory distress, Clear bilaterally





- Abdomen


Abdomen: Normal bowel sounds, Soft, Non tender, Non distended





- Derm


Derm: Normal color, Warm and dry, No rash





- Extremities


Extremities: No deformity, No tenderness to palpate, Normal ROM s pain





- Neuro


Neuro: Alert and oriented X 3, No motor deficit, No sensory deficit, Normal 

speech


Eye Opening: Spontaneous


Motor: Obeys Commands


Verbal: Oriented


GCS Score: 15





- Psych


Psych: Other (Suicidal thoughts)





Results





- Vitals


Vitals: 





                               Vital Signs - 24 hr











  10/20/21





  13:14


 


Temperature 37.0 C


 


Heart Rate 71


 


Respiratory 18





Rate 


 


Blood Pressure 139/90 H


 


O2 Saturation 97








                                     Oxygen











O2 Source                      Room air

















PD MEDICAL DECISION MAKING





- ED course


Complexity details: d/w patient, d/w consultant


ED course: 





75-year-old male who has suicidal ideation with specific plans.  I discussed the

case with the  and [facility], [facility] are 

aware of this patient already has he has been working with an outpatient team.  

We will medically clear him here, his vitals are stable, labs are pending we 

will obtain a Covid PCR test.  He will be monitored closely due to high risk for

suicide.





Departure





- Departure


Disposition: [facility]/Unit DC/Xfer


Clinical Impression: 


 Suicidal intent





Condition: Good

## 2021-10-21 VITALS — SYSTOLIC BLOOD PRESSURE: 167 MMHG | DIASTOLIC BLOOD PRESSURE: 93 MMHG

## 2021-11-04 ENCOUNTER — HOSPITAL ENCOUNTER (EMERGENCY)
Dept: HOSPITAL 76 - ED | Age: 76
Discharge: HOME | End: 2021-11-04
Payer: OTHER GOVERNMENT

## 2021-11-04 VITALS — DIASTOLIC BLOOD PRESSURE: 74 MMHG | SYSTOLIC BLOOD PRESSURE: 113 MMHG

## 2021-11-04 DIAGNOSIS — L30.9: Primary | ICD-10-CM

## 2021-11-04 DIAGNOSIS — I10: ICD-10-CM

## 2021-11-04 DIAGNOSIS — Z87.891: ICD-10-CM

## 2021-11-04 PROCEDURE — 99282 EMERGENCY DEPT VISIT SF MDM: CPT

## 2021-11-04 PROCEDURE — 99284 EMERGENCY DEPT VISIT MOD MDM: CPT

## 2021-11-04 NOTE — ED PHYSICIAN DOCUMENTATION
History of Present Illness





- Stated complaint


Stated Complaint: L ARM RASH





- Chief complaint


Chief Complaint: General





- Additonal information


Additional information: 


75-year-old male presents emergency department for evaluation of a pruritic rash

on his left forearm that began 4 days ago.  He reports that he woke up and he 

had some pruritic red areas that he scratched thus spreading the rash.  He has 

been using an over-the-counter itch ointment without success.  Denies any work 

outside or exposure to poison oak poison ivy.  No new soaps lotions or 

detergents.  No history of similar.  Rash is not painful and crosses multiple 

dermatomes.





hx of atrial fibrillation on eliquis








Review of Systems


Constitutional: reports: Reviewed and negative


Ears: reports: Reviewed and negative


Nose: reports: Reviewed and negative


Throat: reports: Reviewed and negative


Cardiac: reports: Reviewed and negative


Respiratory: reports: Reviewed and negative


GI: reports: Reviewed and negative


: reports: Reviewed and negative


Skin: reports: Rash


Musculoskeletal: reports: Reviewed and negative





PD PAST MEDICAL HISTORY





- Past Medical History


Cardiovascular: Hypertension, High cholesterol, MI


Respiratory: None


Neuro: Seizure disorder


Endocrine/Autoimmune: None


GI: GERD (well controlled)


: None


HEENT: None


Psych: Depression, Anxiety, Panic attacks, Post traumatic stress disorder


Musculoskeletal: None


Derm: None





- Past Surgical History


Past Surgical History: Yes


General: Other


Cardiovascular: Coronary stent





- Present Medications


Home Medications: 


                                Ambulatory Orders











 Medication  Instructions  Recorded  Confirmed


 


Atorvastatin Calcium 80 mg PO DAILY 03/04/16 06/10/21


 


Calcium Citrate/Vitamin D3 [[facility] 1 tab PO BID 03/04/16 06/10/21





Calcium Citrate-Vit D Tab]   


 


Citalopram [CeleXA] 40 mg PO DAILY 03/04/16 06/21/21


 


Levetiracetam [Keppra] 750 mg PO BID 03/04/16 06/10/21


 


Metoprolol Tartrate 12.5 mg PO BID 03/04/16 06/10/21


 


Omeprazole 20 mg PO DAILY 03/04/16 06/10/21


 


lisinopriL [Lisinopril] 5 mg PO DAILY 03/04/16 06/10/21


 


Ibuprofen [Motrin] 600 mg PO Q6H PRN 12/27/20 06/10/21


 


Sodium Fluoride [Prevident 5000] 1 applic TOP DAILY PM 12/27/20 06/10/21


 


Lansoprazole [Prevacid] 0 mg DAILY 03/24/21 06/10/21


 


Tamsulosin HCl [Flomax] 0.4 mg PO DAILY #7 04/27/21 06/10/21


 


predniSONE [Deltasone] 40 mg PO DAILY 5 Days #10 tablet 11/04/21 














- Allergies


Allergies/Adverse Reactions: 


                                    Allergies











Allergy/AdvReac Type Severity Reaction Status Date / Time


 


fluoxetine AdvReac  Anxiety Verified 11/04/21 12:58


 


oxycodone HCl * AdvReac  Hallucinati Verified 11/04/21 12:58





[From OxyContin]   ons  














- Social History


Does the pt smoke?: No


Smoking Status: Former smoker (quit 20 years ago)


Does the pt drink ETOH?: No


Does the pt have substance abuse?: Yes





- Immunizations


Immunizations are current?: Yes





- POLST


Patient has POLST: No





PD ED PE NORMAL





- General


General: Alert and oriented X 3, No acute distress





- Cardiac


Cardiac: RRR.  No: No murmur (Pansystolic murmur)





- Respiratory


Respiratory: No respiratory distress, Clear bilaterally





- Abdomen


Abdomen: Normal bowel sounds, Soft





- Back


Back: No CVA TTP, No spinal TTP





- Derm


Derm: Normal color, Warm and dry.  No: No rash (Left forearm hand and wrist with

multiple areas of erythema induration with vesicular lesions clear fluid 

draining.)





Results





- Vitals


Vitals: 


                               Vital Signs - 24 hr











  11/04/21





  12:54


 


Temperature 36.3 C L


 


Heart Rate 70


 


Respiratory 16





Rate 


 


Blood Pressure 113/74


 


O2 Saturation 99








                                     Oxygen











O2 Source                      Room air

















PD MEDICAL DECISION MAKING





- ED course


Complexity details: d/w patient


ED course: 





75-year-old male here with pruritic vesicular rash on his left forearm that it 

crosses multiple dermatomes.  It is not painful.  I do suspect that this is a 

dermatitis as opposed to varicella.  Patient will be started on a 5-day course 

of prednisone.  Advised close follow-up with his PCP.  Clinically this is not 

consistent with a cellulitis though emergent return precautions were discussed.





Departure





- Departure


Disposition: 01 Home, Self Care


Clinical Impression: 


 Dermatitis





Condition: Stable


Record reviewed to determine appropriate education?: Yes


Prescriptions: 


predniSONE [Deltasone] 40 mg PO DAILY 5 Days #10 tablet


Comments: 


Edward you have a rash on your arm that I am not quite sure what it is caused by

 but I do think short course of steroids will be helpful.





I have sent a prescription for prednisone to the [facility] in [place].  Please 

fill this and begin taking once daily for the next 5 days.





I recommend a cool compress over the arm to help reduce itch.  You can continue 

to use the over-the-counter itch ointment that your wife bought.





Return to the ER if you develop any fevers, red streaking or the rash fails to 

resolve as we expect.

## 2021-11-14 ENCOUNTER — HOSPITAL ENCOUNTER (OUTPATIENT)
Dept: HOSPITAL 76 - DI | Age: 76
Discharge: HOME | End: 2021-11-14
Attending: INTERNAL MEDICINE
Payer: OTHER GOVERNMENT

## 2021-11-14 DIAGNOSIS — I70.0: ICD-10-CM

## 2021-11-14 DIAGNOSIS — I25.10: ICD-10-CM

## 2021-11-14 DIAGNOSIS — Z87.891: ICD-10-CM

## 2021-11-14 DIAGNOSIS — R91.1: ICD-10-CM

## 2021-11-14 DIAGNOSIS — I70.8: ICD-10-CM

## 2021-11-14 DIAGNOSIS — R91.8: ICD-10-CM

## 2021-11-14 DIAGNOSIS — Z13.6: Primary | ICD-10-CM

## 2021-11-14 NOTE — CT REPORT
PROCEDURE:  CT chest without contrast

 

INDICATIONS:  nodules, hepatic steatosis

 

TECHNIQUE: 

Noncontrast 1mm axial images were acquired from the pulmonary apices to the posterior costophrenic an
gles.  Axial 5 mm soft tissue kernel reconstructions were performed as well as 8 mm axial MIP and cor
onal and sagittal 5 mm reformations. For radiation dose reduction, the following was used: automate
d exposure control, adjustment of mA and/or kV according to patient size.

 

COMPARISON:  2/21/2021

 

FINDINGS:  

Image quality:  Excellent.  

 

Lungs and pleura: Right upper lobe groundglass nodule in the posterior segment is stable measuring 1 
cm in diameter. There is a new pleural-based right upper lobe spiculated nodule measuring 7 mm. Lingu
lar left upper lobe 4 mm nodule has decreased in size, previously 7 mm. 

 

Mediastinum:  Heart size is normal. Dense coronary artery vascular calcification as well as aortic va
lvular calcifications stable. No pericardial effusion.  No mediastinal adenopathy by size criteria.  
Thoracic aorta and central pulmonary arteries are normal in size.  Esophagus is normal in caliber.  N
o hiatal hernia.  

 

Bones and chest wall:  No suspicious bony lesions.  No axillary or supraclavicular adenopathy by size
 criteria.  The thyroid is normal in size and there are no incidental findings. Chronic appearing mil
d T7 and T8 wedge-shaped compression fracture are stable from the prior.

 

Abdomen:  Visualized upper abdominal solid organs and bowel loops appear normal in the absence of con
trast.  

 

IMPRESSION:  

1. New right upper lobe 7 mm spiculated nodule. Best practice guidelines suggest 6-12 month follow-up
 evaluation.

2. Stable right upper lobe 1 cm groundglass nodule

3. Improved left upper lobe lingular nodule now measures 4 mm, previously 7 mm

4. Dense coronary artery and aortic valvular calcifications, stable

 

Reviewed by: Lawrence Rodríguez MD on 11/14/2021 10:21 AM [facility]

Approved by: Lawrence Rodríguez MD on 11/14/2021 10:21 AM [facility]

 

 

Station ID:  [id number]

## 2021-11-14 NOTE — ULTRASOUND REPORT
PROCEDURE:  Aorta Screening

 

INDICATIONS:  FORMER SMOKER

 

TECHNIQUE:  Real time scanning was performed of the aorta and iliac arteries, with image documentatio
n.  

 

COMPARISON:  CT of abdomen and pelvis dated 4/27/2021

 

FINDINGS:  

Aorta:  Proximal aortic diameter measures 2.6 x 2.5 cm.  Mid-aorta measures 2.1 x 1.8 cm.  Distal aor
tic diameter is 2.1 x 1.9 cm.  

 

Iliac arteries:  Right common iliac artery measures 1.3 x 1.2 cm.  Left common iliac artery measures 
1.1 x 1.1 cm.  

 

Mild scattered atherosclerotic plaques are seen throughout the abdominal aorta and bilateral iliac ar
teries.

 

IMPRESSION:  

No evidence of abdominal aortic aneurysm. Scattered atherosclerotic plaques throughout abdominal aort
a and bilateral iliac arteries.

 

Reviewed by: Cale Smith MD on 11/14/2021 10:39 AM PST

Approved by: Cale Smith MD on 11/14/2021 10:39 AM PST

 

 

Station ID:  [id number]

## 2021-11-29 ENCOUNTER — HOSPITAL ENCOUNTER (EMERGENCY)
Dept: HOSPITAL 76 - ED | Age: 76
Discharge: HOME | End: 2021-11-29
Payer: OTHER GOVERNMENT

## 2021-11-29 VITALS — SYSTOLIC BLOOD PRESSURE: 137 MMHG | DIASTOLIC BLOOD PRESSURE: 99 MMHG

## 2021-11-29 DIAGNOSIS — Z87.891: ICD-10-CM

## 2021-11-29 DIAGNOSIS — R41.0: Primary | ICD-10-CM

## 2021-11-29 DIAGNOSIS — I10: ICD-10-CM

## 2021-11-29 LAB
ALBUMIN DIAFP-MCNC: 3.7 G/DL (ref 3.2–5.5)
ALBUMIN/GLOB SERPL: 1.3 {RATIO} (ref 1–2.2)
ALP SERPL-CCNC: 64 IU/L (ref 42–121)
ALT SERPL W P-5'-P-CCNC: 12 IU/L (ref 10–60)
AMPHET UR QL SCN: NEGATIVE
ANION GAP SERPL CALCULATED.4IONS-SCNC: 9 MMOL/L (ref 6–13)
AST SERPL W P-5'-P-CCNC: 21 IU/L (ref 10–42)
BARBITURATES UR QL SCN>300 NG/ML: NEGATIVE
BASOPHILS NFR BLD AUTO: 0.1 10^3/UL (ref 0–0.1)
BASOPHILS NFR BLD AUTO: 0.7 %
BENZODIAZ UR QL SCN: NEGATIVE
BILIRUB BLD-MCNC: 1.1 MG/DL (ref 0.2–1)
BUN SERPL-MCNC: 12 MG/DL (ref 6–20)
CALCIUM UR-MCNC: 9.1 MG/DL (ref 8.5–10.3)
CHLORIDE SERPL-SCNC: 106 MMOL/L (ref 101–111)
CO2 SERPL-SCNC: 25 MMOL/L (ref 21–32)
COCAINE UR-SCNC: NEGATIVE UMOL/L
CREAT SERPLBLD-SCNC: 0.9 MG/DL (ref 0.6–1.2)
EOSINOPHIL # BLD AUTO: 0.2 10^3/UL (ref 0–0.7)
EOSINOPHIL NFR BLD AUTO: 2.2 %
ERYTHROCYTE [DISTWIDTH] IN BLOOD BY AUTOMATED COUNT: 12 % (ref 12–15)
GFRSERPLBLD MDRD-ARVRAT: 82 ML/MIN/{1.73_M2} (ref 89–?)
GLOBULIN SER-MCNC: 2.9 G/DL (ref 2.1–4.2)
GLUCOSE SERPL-MCNC: 97 MG/DL (ref 70–100)
HCT VFR BLD AUTO: 44.7 % (ref 42–52)
HGB UR QL STRIP: 15.7 G/DL (ref 14–18)
LIPASE SERPL-CCNC: 23 U/L (ref 22–51)
LYMPHOCYTES # SPEC AUTO: 1.3 10^3/UL (ref 1.5–3.5)
LYMPHOCYTES NFR BLD AUTO: 15.9 %
MCH RBC QN AUTO: 32.4 PG (ref 27–31)
MCHC RBC AUTO-ENTMCNC: 35.1 G/DL (ref 32–36)
MCV RBC AUTO: 92.2 FL (ref 80–94)
METHADONE UR QL SCN: NEGATIVE
METHAMPHET UR QL SCN: NEGATIVE
MONOCYTES # BLD AUTO: 0.7 10^3/UL (ref 0–1)
MONOCYTES NFR BLD AUTO: 9.2 %
NEUTROPHILS # BLD AUTO: 5.8 10^3/UL (ref 1.5–6.6)
NEUTROPHILS # SNV AUTO: 8.1 X10^3/UL (ref 4.8–10.8)
NEUTROPHILS NFR BLD AUTO: 71.6 %
NRBC # BLD AUTO: 0 /100WBC
NRBC # BLD AUTO: 0 X10^3/UL
OPIATES UR QL SCN: NEGATIVE
PDW BLD AUTO: 9.2 FL (ref 7.4–11.4)
PLATELET # BLD: 151 10^3/UL (ref 130–450)
POTASSIUM SERPL-SCNC: 3.4 MMOL/L (ref 3.5–5)
PROT SPEC-MCNC: 6.6 G/DL (ref 6.7–8.2)
RBC MAR: 4.85 10^6/UL (ref 4.7–6.1)
SODIUM SERPLBLD-SCNC: 140 MMOL/L (ref 135–145)
THC UR QL SCN: NEGATIVE
VOLATILE DRUGS POS SERPL SCN: (no result)

## 2021-11-29 PROCEDURE — 36415 COLL VENOUS BLD VENIPUNCTURE: CPT

## 2021-11-29 PROCEDURE — 93005 ELECTROCARDIOGRAM TRACING: CPT

## 2021-11-29 PROCEDURE — 99283 EMERGENCY DEPT VISIT LOW MDM: CPT

## 2021-11-29 PROCEDURE — 84484 ASSAY OF TROPONIN QUANT: CPT

## 2021-11-29 PROCEDURE — 85025 COMPLETE CBC W/AUTO DIFF WBC: CPT

## 2021-11-29 PROCEDURE — 80053 COMPREHEN METABOLIC PANEL: CPT

## 2021-11-29 PROCEDURE — 80306 DRUG TEST PRSMV INSTRMNT: CPT

## 2021-11-29 PROCEDURE — 83690 ASSAY OF LIPASE: CPT

## 2021-11-29 PROCEDURE — 99284 EMERGENCY DEPT VISIT MOD MDM: CPT

## 2021-11-29 PROCEDURE — 80320 DRUG SCREEN QUANTALCOHOLS: CPT

## 2021-11-29 NOTE — ED PHYSICIAN DOCUMENTATION
PD HPI FOCAL NEURO





- Stated complaint


Stated Complaint: CONFUSION





- Chief complaint


Chief Complaint: Neuro





- History obtained from


History obtained from: Patient





- Additional information


Additional information: 





75-year-old gentleman with history of depression anxiety and vascular disease 

presents with confusion starting this morning.  He states that his wife was 

recently hospitalized with a hip fracture and now is in rehab.  Because of that 

he is staying with his son.  His son does not drink coffee so he did not get his

morning coffee this morning and subsequently felt like he was just off.  [place] 

like he could not remember anything.  He did have the wherewithal to go to the 

store and buy chicken thighs for his dog and individually wrapped them 

anticipating that he would get admitted to the hospital.  He knows that tomorrow

is his birthday.  He seems alert and oriented.  He denies headaches, chest pain,

trouble breathing.





Review of Systems


Ten Systems: 10 systems reviewed and negative


Constitutional: denies: Fever, Chills


Throat: reports: Reviewed and negative


Cardiac: reports: Reviewed and negative


Respiratory: reports: Reviewed and negative





PD PAST MEDICAL HISTORY





- Past Medical History


Cardiovascular: Hypertension, High cholesterol, MI


Respiratory: None


Neuro: Seizure disorder


Endocrine/Autoimmune: None


GI: GERD (well controlled)


: None


HEENT: None


Psych: Depression, Anxiety, Panic attacks, Post traumatic stress disorder


Musculoskeletal: None


Derm: None





- Past Surgical History


Past Surgical History: Yes


General: Other


Cardiovascular: Coronary stent





- Present Medications


Home Medications: 


                                Ambulatory Orders











 Medication  Instructions  Recorded  Confirmed


 


Atorvastatin Calcium 80 mg PO DAILY 03/04/16 06/10/21


 


Calcium Citrate/Vitamin D3 [[facility] 1 tab PO BID 03/04/16 06/10/21





Calcium Citrate-Vit D Tab]   


 


Citalopram [CeleXA] 40 mg PO DAILY 03/04/16 06/21/21


 


Levetiracetam [Keppra] 750 mg PO BID 03/04/16 06/10/21


 


Metoprolol Tartrate 12.5 mg PO BID 03/04/16 06/10/21


 


Omeprazole 20 mg PO DAILY 03/04/16 06/10/21


 


lisinopriL [Lisinopril] 5 mg PO DAILY 03/04/16 06/10/21


 


Ibuprofen [Motrin] 600 mg PO Q6H PRN 12/27/20 06/10/21


 


Sodium Fluoride [Prevident 5000] 1 applic TOP DAILY PM 12/27/20 06/10/21


 


Lansoprazole [Prevacid] 0 mg DAILY 03/24/21 06/10/21


 


Tamsulosin HCl [Flomax] 0.4 mg PO DAILY #7 04/27/21 06/10/21


 


predniSONE [Deltasone] 40 mg PO DAILY 5 Days #10 tablet 11/04/21 














- Allergies


Allergies/Adverse Reactions: 


                                    Allergies











Allergy/AdvReac Type Severity Reaction Status Date / Time


 


fluoxetine AdvReac  Anxiety Verified 11/29/21 15:43


 


oxycodone HCl * AdvReac  Hallucinati Verified 11/29/21 15:43





[From OxyContin]   ons  














- Social History


Does the pt smoke?: No


Smoking Status: Former smoker (quit 20 years ago)


Does the pt drink ETOH?: No


Does the pt have substance abuse?: Yes





- Immunizations


Immunizations are current?: Yes





- POLST


Patient has POLST: No





PD ED PE NORMAL





- Vitals


Vital signs reviewed: Yes





- General


General: Alert and oriented X 3, No acute distress, Well developed/nourished





- HEENT


HEENT: PERRL, EOMI





- Neck


Neck: Supple, no meningeal sign, No bony TTP





- Cardiac


Cardiac: RRR, No murmur





- Respiratory


Respiratory: No respiratory distress, Clear bilaterally





- Abdomen


Abdomen: Normal bowel sounds, Soft, Non tender





- Back


Back: No CVA TTP, No spinal TTP





- Derm


Derm: Normal color, Warm and dry





- Extremities


Extremities: No edema, No calf tenderness / cord





- Neuro


Neuro: Alert and oriented X 3, Normal speech, Other (NIHSS zero at 1620)





Results





- Vitals


Vitals: 


                               Vital Signs - 24 hr











  11/29/21 11/29/21 11/29/21





  15:43 15:48 16:02


 


Temperature 36.5 C 36.5 C 


 


Heart Rate 72 87 87


 


Respiratory 16 20 20





Rate   


 


Blood Pressure 138/92 H 130/88 H 130/88 H


 


O2 Saturation 98 96 96














  11/29/21





  18:03


 


Temperature 


 


Heart Rate 69


 


Respiratory 16





Rate 


 


Blood Pressure 137/99 H


 


O2 Saturation 97








                                     Oxygen











O2 Source                      Room air

















- EKG (time done)


  ** 1550


Rate: Rate (enter#) (67)


Rhythm: NSR


Axis: Normal


Intervals: Normal AL


QRS: Normal


Ischemia: Non specific changes


Compare to prior EKG: Unchanged from prior EKG (Mild ST elevation anteriorly is 

unchanged from April 13 of this year)





- Labs


Labs: 


                                Laboratory Tests











  11/29/21 11/29/21 11/29/21





  16:05 16:05 16:05


 


WBC  8.1  


 


RBC  4.85  


 


Hgb  15.7  


 


Hct  44.7  


 


MCV  92.2  


 


MCH  32.4 H  


 


MCHC  35.1  


 


RDW  12.0  


 


Plt Count  151  


 


MPV  9.2  


 


Neut # (Auto)  5.8  


 


Lymph # (Auto)  1.3 L  


 


Mono # (Auto)  0.7  


 


Eos # (Auto)  0.2  


 


Baso # (Auto)  0.1  


 


Absolute Nucleated RBC  0.00  


 


Nucleated RBC %  0.0  


 


Sodium   140 


 


Potassium   3.4 L 


 


Chloride   106 


 


Carbon Dioxide   25 


 


Anion Gap   9.0 


 


BUN   12 


 


Creatinine   0.9 


 


Estimated GFR (MDRD)   82 L 


 


Glucose   97 


 


Calcium   9.1 


 


Total Bilirubin   1.1 H 


 


AST   21 


 


ALT   12 


 


Alkaline Phosphatase   64 


 


Troponin I High Sens    20.9 H*


 


Total Protein   6.6 L 


 


Albumin   3.7 


 


Globulin   2.9 


 


Albumin/Globulin Ratio   1.3 


 


Lipase   23 


 


Urine Opiates Screen   


 


Ur Oxycodone Screen   


 


Urine Methadone Screen   


 


Ur Propoxyphene Screen   


 


Ur Barbiturates Screen   


 


Ur Tricyclics Screen   


 


Ur Phencyclidine Scrn   


 


Ur Amphetamine Screen   


 


U Methamphetamines Scrn   


 


U Benzodiazepines Scrn   


 


Urine Cocaine Screen   


 


U Cannabinoids Screen   


 


Ethyl Alcohol   














  11/29/21 11/29/21





  16:05 17:30


 


WBC  


 


RBC  


 


Hgb  


 


Hct  


 


MCV  


 


MCH  


 


MCHC  


 


RDW  


 


Plt Count  


 


MPV  


 


Neut # (Auto)  


 


Lymph # (Auto)  


 


Mono # (Auto)  


 


Eos # (Auto)  


 


Baso # (Auto)  


 


Absolute Nucleated RBC  


 


Nucleated RBC %  


 


Sodium  


 


Potassium  


 


Chloride  


 


Carbon Dioxide  


 


Anion Gap  


 


BUN  


 


Creatinine  


 


Estimated GFR (MDRD)  


 


Glucose  


 


Calcium  


 


Total Bilirubin  


 


AST  


 


ALT  


 


Alkaline Phosphatase  


 


Troponin I High Sens  


 


Total Protein  


 


Albumin  


 


Globulin  


 


Albumin/Globulin Ratio  


 


Lipase  


 


Urine Opiates Screen   NEGATIVE


 


Ur Oxycodone Screen   NEGATIVE


 


Urine Methadone Screen   NEGATIVE


 


Ur Propoxyphene Screen   NEGATIVE


 


Ur Barbiturates Screen   NEGATIVE


 


Ur Tricyclics Screen   NEGATIVE


 


Ur Phencyclidine Scrn   NEGATIVE


 


Ur Amphetamine Screen   NEGATIVE


 


U Methamphetamines Scrn   NEGATIVE


 


U Benzodiazepines Scrn   NEGATIVE


 


Urine Cocaine Screen   NEGATIVE


 


U Cannabinoids Screen   NEGATIVE


 


Ethyl Alcohol  < 5.0 














PD MEDICAL DECISION MAKING





- ED course


ED course: 





75-year-old gentleman presents with resolved confusion.  CT of the head was 

normal.  Labs unremarkable.  I think it was probably multifactorial given the 

stress of what is going on with his wife after she just broke his hip, being out

of his normal routine.  He admits he probably took some of his medications wrong

yesterday and then did not get his morning coffee.  He is feeling better now and

comfortable with discharge.  He will stay with family.


Note made of his troponin, however in the absence of chest pain or ischemic EKG 

changes this is unlikely to be of significance given the fact that it is only 1 

point above normal.





Departure





- Departure


Disposition: 01 Home, Self Care


Clinical Impression: 


 Episodic confusion





Condition: Good


Record reviewed to determine appropriate education?: Yes


Instructions:  ED Confusion


Comments: 


Be sure to take your medications as prescribed.  Return for worsening symptoms. 

Follow-up stay with family tonight.  Follow-up with your primary care physician,

next available appointment.


Discharge Date/Time: 11/29/21 18:11

## 2021-11-29 NOTE — CT REPORT
PROCEDURE:  HEAD WO

 

INDICATIONS:  Confusion

 

TECHNIQUE:  

Noncontrast 4.5 mm thick angled axial sections acquired from the foramen magnum to the vertex.  For r
adiation dose reduction, the following was used:  automated exposure control, adjustment of mA and/or
 kV according to patient size.

 

COMPARISON:  None.

 

FINDINGS:  

Image quality:  Excellent.  

 

CSF spaces:  Basal cisterns are patent.  No extra-axial fluid collections.  Ventricles are normal in 
size and shape.  

 

Brain:  No midline shift.  No intracranial masses or hemorrhage.  Gray-white matter interface is norm
al.  

 

Skull and face:  Calvarium and visualized facial bones are intact, without suspicious lesions.  

 

Sinuses:  Visualized sinuses and mastoids are clear.  

 

IMPRESSION:  No acute intracranial abnormality.

 

Reviewed by: Nahum Oneill MD on 11/29/2021 4:24 PM [facility]

Approved by: Nahum Oneill MD on 11/29/2021 4:24 PM [facility]

 

 

Station ID:  [id number]

## 2021-11-29 NOTE — XRAY REPORT
PROCEDURE:  Chest 1 View X-Ray

 

INDICATIONS:  Chest Pain

 

TECHNIQUE:  One view of the chest was acquired.  

 

COMPARISON:  CT chest 11/14/2021.

 

FINDINGS:  

 

Surgical changes and devices:  None.  

 

Lungs and pleura:  No pleural effusions or pneumothorax.  Lungs are clear.  

 

Mediastinum:  Mediastinal contours appear normal.  Heart size is normal.  

 

Bones and chest wall:  No suspicious bony lesions.  Overlying soft tissues appear unremarkable.  

 

 

IMPRESSION:  

 

No acute cardiopulmonary disease process.

 

Reviewed by: Jihan Mckinney MD, PhD on 11/29/2021 4:16 PM PST

Approved by: Jihan Mckinney MD, PhD on 11/29/2021 4:16 PM PST

 

 

Station ID:  [id number]

## 2022-01-03 ENCOUNTER — HOSPITAL ENCOUNTER (OUTPATIENT)
Dept: HOSPITAL 76 - DI.S | Age: 77
Discharge: HOME | End: 2022-01-03
Attending: NURSE PRACTITIONER
Payer: OTHER GOVERNMENT

## 2022-01-03 DIAGNOSIS — M47.816: Primary | ICD-10-CM

## 2022-01-03 DIAGNOSIS — M51.36: ICD-10-CM

## 2022-01-03 NOTE — XRAY REPORT
PROCEDURE:  Lumbar Spine 2 View

 

INDICATIONS:  PARESTHESIA

 

TECHNIQUE:  3 views of the lumbar spine were acquired.  

 

COMPARISON:  CT abdomen pelvis 4/27/2021.

 

FINDINGS:  

 

Bones:  5 non-rib-bearing vertebrae are present. Minimal scoliosis. Multilevel intervertebral disc sp
ace height loss. Lower lumbar spine facet joint hypertrophy. Small vertebral body osteophytes.  No ve
rtebral body compression fractures.  No suspicious bony lesions.  

 

Soft tissues:  Overlying bowel gas pattern is normal.  No suspicious soft tissue calcifications.  Ruby
pect pelvic mass.

 

IMPRESSION:  

No compression fracture. 

 

Moderate degenerative change in the lumbar spine.

 

Reviewed by: Tomer Vazquez MD on 1/3/2022 12:23 PM PST

Approved by: Tomer Vazquez MD on 1/3/2022 12:23 PM PST

 

 

Station ID:  [id number]

## 2022-02-22 ENCOUNTER — HOSPITAL ENCOUNTER (EMERGENCY)
Dept: HOSPITAL 76 - ED | Age: 77
LOS: 1 days | Discharge: HOME | End: 2022-02-23
Payer: OTHER GOVERNMENT

## 2022-02-22 ENCOUNTER — HOSPITAL ENCOUNTER (OUTPATIENT)
Dept: HOSPITAL 76 - EMS | Age: 77
Discharge: ANOTHER HOSPITAL | End: 2022-02-22
Payer: OTHER GOVERNMENT

## 2022-02-22 DIAGNOSIS — R14.2: Primary | ICD-10-CM

## 2022-02-22 DIAGNOSIS — I10: ICD-10-CM

## 2022-02-22 DIAGNOSIS — R07.9: ICD-10-CM

## 2022-02-22 DIAGNOSIS — R07.9: Primary | ICD-10-CM

## 2022-02-22 DIAGNOSIS — R06.6: ICD-10-CM

## 2022-02-22 DIAGNOSIS — Z79.01: ICD-10-CM

## 2022-02-22 LAB
ALBUMIN DIAFP-MCNC: 3.8 G/DL (ref 3.2–5.5)
ALBUMIN/GLOB SERPL: 1.2 {RATIO} (ref 1–2.2)
ALP SERPL-CCNC: 68 IU/L (ref 42–121)
ALT SERPL W P-5'-P-CCNC: 14 IU/L (ref 10–60)
ANION GAP SERPL CALCULATED.4IONS-SCNC: 9 MMOL/L (ref 6–13)
AST SERPL W P-5'-P-CCNC: 24 IU/L (ref 10–42)
BASOPHILS NFR BLD AUTO: 0.1 10^3/UL (ref 0–0.1)
BASOPHILS NFR BLD AUTO: 0.8 %
BILIRUB BLD-MCNC: 0.9 MG/DL (ref 0.2–1)
BUN SERPL-MCNC: 16 MG/DL (ref 6–20)
CALCIUM UR-MCNC: 9.2 MG/DL (ref 8.5–10.3)
CHLORIDE SERPL-SCNC: 98 MMOL/L (ref 101–111)
CO2 SERPL-SCNC: 28 MMOL/L (ref 21–32)
CREAT SERPLBLD-SCNC: 1.4 MG/DL (ref 0.6–1.2)
EOSINOPHIL # BLD AUTO: 0.2 10^3/UL (ref 0–0.7)
EOSINOPHIL NFR BLD AUTO: 1.8 %
ERYTHROCYTE [DISTWIDTH] IN BLOOD BY AUTOMATED COUNT: 11.9 % (ref 12–15)
GFRSERPLBLD MDRD-ARVRAT: 49 ML/MIN/{1.73_M2} (ref 89–?)
GLOBULIN SER-MCNC: 3.2 G/DL (ref 2.1–4.2)
GLUCOSE SERPL-MCNC: 97 MG/DL (ref 70–100)
HCT VFR BLD AUTO: 45.1 % (ref 42–52)
HGB UR QL STRIP: 15.7 G/DL (ref 14–18)
LIPASE SERPL-CCNC: 29 U/L (ref 22–51)
LYMPHOCYTES # SPEC AUTO: 1.1 10^3/UL (ref 1.5–3.5)
LYMPHOCYTES NFR BLD AUTO: 12.5 %
MCH RBC QN AUTO: 32.2 PG (ref 27–31)
MCHC RBC AUTO-ENTMCNC: 34.8 G/DL (ref 32–36)
MCV RBC AUTO: 92.6 FL (ref 80–94)
MONOCYTES # BLD AUTO: 0.8 10^3/UL (ref 0–1)
MONOCYTES NFR BLD AUTO: 8.7 %
NEUTROPHILS # BLD AUTO: 6.7 10^3/UL (ref 1.5–6.6)
NEUTROPHILS # SNV AUTO: 8.9 X10^3/UL (ref 4.8–10.8)
NEUTROPHILS NFR BLD AUTO: 75.9 %
NRBC # BLD AUTO: 0 /100WBC
NRBC # BLD AUTO: 0 X10^3/UL
PDW BLD AUTO: 9.2 FL (ref 7.4–11.4)
PLATELET # BLD: 181 10^3/UL (ref 130–450)
POTASSIUM SERPL-SCNC: 3.9 MMOL/L (ref 3.5–5)
PROT SPEC-MCNC: 7 G/DL (ref 6.7–8.2)
RBC MAR: 4.87 10^6/UL (ref 4.7–6.1)
SODIUM SERPLBLD-SCNC: 135 MMOL/L (ref 135–145)

## 2022-02-22 PROCEDURE — 99284 EMERGENCY DEPT VISIT MOD MDM: CPT

## 2022-02-22 PROCEDURE — 93005 ELECTROCARDIOGRAM TRACING: CPT

## 2022-02-22 PROCEDURE — 36415 COLL VENOUS BLD VENIPUNCTURE: CPT

## 2022-02-22 PROCEDURE — 99283 EMERGENCY DEPT VISIT LOW MDM: CPT

## 2022-02-22 PROCEDURE — 80053 COMPREHEN METABOLIC PANEL: CPT

## 2022-02-22 PROCEDURE — 85025 COMPLETE CBC W/AUTO DIFF WBC: CPT

## 2022-02-22 PROCEDURE — 71046 X-RAY EXAM CHEST 2 VIEWS: CPT

## 2022-02-22 PROCEDURE — 83690 ASSAY OF LIPASE: CPT

## 2022-02-22 PROCEDURE — 84484 ASSAY OF TROPONIN QUANT: CPT

## 2022-02-22 NOTE — ED PHYSICIAN DOCUMENTATION
History of Present Illness





- Stated complaint


Stated Complaint: HICCUPS





- Chief complaint


Chief Complaint: General





- History obtained from


History obtained from: Patient





- History of Present Illness


Timing: Today


Pain level now: 1 (not pain, per patient, discomfort)


Radiates to: no radiation


Improved by: partial improvement with eructation





- Additonal information


Additional information: 





c/o vague diffuse anterior chest discomfort; insists that the only way to 

describe his symptoms is I have to burp (per patient), and he says he had 

partial relief when he was able to belch PTA. He has subsequently developed 

hiccups. 





Review of Systems


Constitutional: reports: Reviewed and negative


Cardiac: reports: Chest pain / pressure (discomfort).  denies: Palpitations, 

Pedal edema


Respiratory: reports: Reviewed and negative


GI: reports: Reviewed and negative


Musculoskeletal: reports: Reviewed and negative





PD PAST MEDICAL HISTORY





- Past Medical History


Past Medical History: Yes


Cardiovascular: Hypertension, High cholesterol, MI


Respiratory: None


Neuro: Seizure disorder


Endocrine/Autoimmune: None


GI: GERD


: None


HEENT: None


Psych: Depression, Anxiety, Panic attacks, Post traumatic stress disorder


Musculoskeletal: None


Derm: None





- Past Surgical History


Past Surgical History: Yes


General: Other


Cardiovascular: Coronary stent





- Present Medications


Home Medications: 


                                Ambulatory Orders











 Medication  Instructions  Recorded  Confirmed


 


Atorvastatin Calcium 80 mg PO DAILY 03/04/16 02/22/22


 


Calcium Citrate/Vitamin D3 [[facility] 1 tab PO BID 03/04/16 02/22/22





Calcium Citrate-Vit D Tab]   


 


Citalopram [CeleXA] 40 mg PO DAILY 03/04/16 02/22/22


 


Levetiracetam [Keppra] 750 mg PO BID 03/04/16 02/22/22


 


Metoprolol Tartrate 12.5 mg PO BID 03/04/16 02/22/22


 


Fluoride (Sodium) [Prevident 5000 1 applic TOP DAILY PM 12/27/20 02/22/22





Dry Mouth]   


 


Ibuprofen [Motrin] 600 mg PO Q6H PRN 12/27/20 02/22/22


 


Apixaban [Eliquis] 5 mg PO DAILY 02/22/22 02/22/22


 


Aspirin EC [Ecotrin] 81 mg PO DAILY 02/22/22 02/22/22


 


Cholecalciferol (Vitamin D3) 1,000 mcg PO DAILY 02/22/22 02/22/22





[Vitamin D3]   


 


Escitalopram [Lexapro] 10 mg PO DAILY 02/22/22 02/22/22


 


Ibuprofen [Ibuprofen Ib] 600 mg PO PRN PRN 02/22/22 02/22/22


 


Meclizine HCl [Motion Sickness] 25 mg PO PRN PRN 02/22/22 02/22/22


 


Pantoprazole [Protonix] 40 mg PO DAILY 02/22/22 02/22/22


 


Sildenafil Citrate 100 mg PO PRN PRN 02/22/22 02/22/22


 


Baclofen [Lioresal] 10 mg PO TID PRN #10 tablet 02/23/22 














- Allergies


Allergies/Adverse Reactions: 


                                    Allergies











Allergy/AdvReac Type Severity Reaction Status Date / Time


 


fluoxetine AdvReac  Anxiety Verified 02/22/22 20:50


 


oxycodone HCl * AdvReac  Hallucinati Verified 02/22/22 20:50





[From OxyContin]   ons  














- Social History


Does the pt smoke?: No


Smoking Status: Never smoker


Does the pt drink ETOH?: No


Does the pt have substance abuse?: Yes





- Immunizations


Immunizations are current?: Yes





- POLST


Patient has POLST: No





PD ED PE NORMAL





- Vitals


Vital signs reviewed: Yes





- General


General: Alert and oriented X 3, No acute distress, Well developed/nourished





- HEENT


HEENT: Moist mucous membranes





- Neck


Neck: Supple, no meningeal sign





- Cardiac


Cardiac: RRR, No murmur





- Respiratory


Respiratory: No respiratory distress, Clear bilaterally





- Abdomen


Abdomen: Normal bowel sounds, Soft, Non tender, Non distended





- Back


Back: No CVA TTP





Results





- Vitals


Vitals: 


                               Vital Signs - 24 hr











  02/23/22 02/23/22





  00:00 00:17


 


Heart Rate 68 


 


Respiratory 20 16





Rate  


 


Blood Pressure 132/90 H 


 


O2 Saturation 98 








                                     Oxygen











O2 Source                      Room air

















- EKG (time done)


  ** No standard instances


Rate: Rate (enter#) (64)


Rhythm: NSR


Axis: Normal


Intervals: Normal CT


QRS: Normal


Ischemia: Normal ST segments, Non specific changes (ST segment changes V1-V3, 

comparable to previous EKG)





- Labs


Labs: 


                                Laboratory Tests











  02/22/22 02/22/22 02/22/22





  22:15 22:15 22:15


 


WBC  8.9  


 


RBC  4.87  


 


Hgb  15.7  


 


Hct  45.1  


 


MCV  92.6  


 


MCH  32.2 H  


 


MCHC  34.8  


 


RDW  11.9 L  


 


Plt Count  181  


 


MPV  9.2  


 


Neut # (Auto)  6.7 H  


 


Lymph # (Auto)  1.1 L  


 


Mono # (Auto)  0.8  


 


Eos # (Auto)  0.2  


 


Baso # (Auto)  0.1  


 


Absolute Nucleated RBC  0.00  


 


Nucleated RBC %  0.0  


 


Sodium   135 


 


Potassium   3.9 


 


Chloride   98 L 


 


Carbon Dioxide   28 


 


Anion Gap   9.0 


 


BUN   16 


 


Creatinine   1.4 H 


 


Estimated GFR (MDRD)   49 L 


 


Glucose   97 


 


Calcium   9.2 


 


Total Bilirubin   0.9 


 


AST   24 


 


ALT   14 


 


Alkaline Phosphatase   68 


 


Troponin I High Sens    16.2


 


Total Protein   7.0 


 


Albumin   3.8 


 


Globulin   3.2 


 


Albumin/Globulin Ratio   1.2 


 


Lipase   29 














PD MEDICAL DECISION MAKING





- ED course


Complexity details: reviewed old records, reviewed results, re-evaluated 

patient, considered differential, d/w patient


ED course: 





unremarkable EKG, CXR, blood tests. His symptoms resolved after protonix and 

baclofen (for hiccups). Results reviewed with patient, return precautions 

discussed





Departure





- Departure


Disposition: 01 Home, Self Care


Clinical Impression: 


 Chest discomfort, Hiccups





Condition: Good


Instructions:  ED Chest Pain Atypical Unkn Cause, ED Hiccups


Prescriptions: 


Baclofen [Lioresal] 10 mg PO TID PRN #10 tablet


 PRN Reason: Hiccups


Comments: 


You should follow up with your primary care provider for reevaluation; further 

tests are at the discretion of your primary care provider, but might be needed 

even if you do not have recurrence of your symptoms. 


You should take an acid-blocking medication once daily for two weeks, such as 

prilosec or nexium. These are available over the counter and thus do not require

 a prescription. Follow the instructions on the label.


A prescription for baclof[facility] in [place]. This medication is to be taken as needed for hiccups. 


Discharge Date/Time: 02/23/22 00:30

## 2022-02-22 NOTE — XRAY REPORT
PROCEDURE:  Chest 2 View X-Ray

 

INDICATIONS:  chest discomfort

 

TECHNIQUE:  2 view(s) of the chest.  

 

COMPARISON:  11/29/2021.

 

FINDINGS:  

 

Surgical changes and devices:  None.  

 

Lungs and pleura:  No pleural effusions or pneumothorax.  Lungs are clear.  

 

Mediastinum:  Mediastinal contours are normal.  Heart size is normal.  

 

Bones and chest wall:  No suspicious bony abnormalities. Old mild mid thoracic osteoporotic compressi
ons. Soft tissues appear unremarkable.  

 

IMPRESSION:  No evidence of acute pulmonary process.

 

Reviewed by: Liam Huddleston MD on 2/22/2022 10:20 PM PST

Approved by: Liam Huddleston MD on 2/22/2022 10:20 PM PST

 

 

Station ID:  [id number]

## 2022-02-23 VITALS — DIASTOLIC BLOOD PRESSURE: 90 MMHG | SYSTOLIC BLOOD PRESSURE: 132 MMHG

## 2022-02-26 ENCOUNTER — HOSPITAL ENCOUNTER (OUTPATIENT)
Dept: HOSPITAL 76 - DI | Age: 77
Discharge: HOME | End: 2022-02-26
Attending: NURSE PRACTITIONER
Payer: OTHER GOVERNMENT

## 2022-02-26 DIAGNOSIS — I25.10: ICD-10-CM

## 2022-02-26 DIAGNOSIS — Z12.2: Primary | ICD-10-CM

## 2022-02-26 DIAGNOSIS — R91.8: ICD-10-CM

## 2022-02-26 NOTE — CT REPORT
PROCEDURE:  Low Dose Lung Cancer Screen

 

INDICATIONS:  SCREENING FOR LUNG CA

 

TECHNIQUE:  

Noncontrast low-dose images were acquired from the pulmonary apices to the posterior costophrenic ang
les.  Multiplanar MIP reformats were then acquired.  For radiation dose reduction, the following was 
used:  automated exposure control, adjustment of mA and/or kV according to patient size.

 

COMPARISON:  11/14/2021, 2/21/2021. Correlation is also made with abdomen and pelvis CT, 4/27/2021.

 

FINDINGS:  

Image quality:  Excellent.  

 

Lungs and pleura:  Within the right upper lobe, there is a groundglass nodule, as on series 4 image 8
2 measuring 10 x 7 mm. This is stable compared to the prior examination. 

 

Within the right upper lobe posteriorly, there is a poorly defined ground glass appearing nodule seen
, as on series 4 image 100 measuring 22 x 17 mm. This is new compared to the prior examination.

 

Within the right upper lobe laterally and posteriorly, there is a focus of groundglass opacity seen, 
as on series 4 image 112 measuring 13 x 10 mm, which is stable compared to the prior.

 

Within the lingula laterally, there is an additional focus of groundglass opacity seen, as on series 
4 image 188 measuring 27 x 16 mm, which is also new compared to the prior examination.

 

No soft tissue nodules are seen. The central airways are patent. No pleural effusions or pneumothorax
 can be seen.  

 

Mediastinum:  Heart size is normal.  No pericardial effusion.  No mediastinal adenopathy by size crit
eria.  Thoracic aorta and central pulmonary arteries are normal in size.  Atherosclerotic calcificati
on is seen. There is moderate to prominent coronary artery calcification. Esophagus is normal in [place]
kenneth.  No hiatal hernia.  

 

Bones and chest wall:  No suspicious bony lesions. Several midthoracic anterior wedge deformities are
 seen, with associated accentuated thoracic kyphosis. Age-appropriate degenerative changes are seen. 
   No axillary or supraclavicular adenopathy by size criteria.  The thyroid is normal in size and the
re are no incidental findings.

 

Abdomen: Within the right kidney, there is a partially seen presumed water density cyst that measures
 3 cm. This is better seen on the prior abdomen pelvis CT. The visualized portions of the upper abdom
inal structures are otherwise within normal limits.  

 

 

IMPRESSION:  

 

4 total areas of groundglass opacity are seen, 2 of which are new compared to the prior examination d
ated 11/14/2021. Given the rapid appearance of these foci, neoplasm is considered to be highly unlike
ly. Infectious or inflammatory change is formal likely.

 

Follow-up noncontrast chest CT in 3 months is recommended to ensure resolution/improvement.

 

Atherosclerotic calcification is seen, with moderate to prominent coronary artery calcification.

 

 

Incidental note is made of:

Mid thoracic anterior wedge deformities, with associated accentuated thoracic kyphosis

Simple appearing left renal cyst

 

Reviewed by: Luiz Armstrong MD on 2/26/2022 8:57 AM [facility]

Approved by: Luiz Armstrong MD on 2/26/2022 8:57 AM [facility]

 

 

Station ID:  [id number]

## 2022-04-14 NOTE — XRAY REPORT
PROCEDURE:  Lumbar Spine Complete

 

INDICATIONS:  LOW BACK PAIN

 

TECHNIQUE:  5 views of the lumbar spine were acquired.  

 

COMPARISON:  X-ray lumbar spine 1/3/2022

 

FINDINGS:  

 

Bones:  5 nonrib-bearing vertebrae are present.  There is 3 mm retrolisthesis of L2 on L3, L3 on L4, 
L4 on L5. Severe disc space narrowing is present L4-5 and L5-S1 with severe foraminal narrowing at L5
-S1. Moderate disc space narrowing is present L1-L2 and L2-3. Multilevel anterior osteophytes are pre
sent. No vertebral body compression fractures.  No suspicious bony lesions.  

 

Soft tissues:  Overlying bowel gas pattern is normal.  No suspicious soft tissue calcifications.  

 

Oblique images:  No pars defects.  

 

IMPRESSION:  Multilevel degenerative changes overall stable compared to prior exam.

 

Reviewed by: Glo Jara MD on 4/14/2022 5:47 PM PDT

Approved by: Glo Jara MD on 4/14/2022 5:47 PM PDT

 

 

Station ID:  [id number]

## 2022-04-15 ENCOUNTER — HOSPITAL ENCOUNTER (EMERGENCY)
Dept: HOSPITAL 76 - ED | Age: 77
Discharge: HOME | End: 2022-04-15
Payer: OTHER GOVERNMENT

## 2022-04-15 ENCOUNTER — HOSPITAL ENCOUNTER (OUTPATIENT)
Dept: HOSPITAL 76 - LAB.S | Age: 77
Discharge: HOME | End: 2022-04-15
Attending: NURSE PRACTITIONER
Payer: OTHER GOVERNMENT

## 2022-04-15 VITALS — SYSTOLIC BLOOD PRESSURE: 144 MMHG | DIASTOLIC BLOOD PRESSURE: 79 MMHG

## 2022-04-15 DIAGNOSIS — M47.816: ICD-10-CM

## 2022-04-15 DIAGNOSIS — M43.16: ICD-10-CM

## 2022-04-15 DIAGNOSIS — Z20.822: ICD-10-CM

## 2022-04-15 DIAGNOSIS — R63.4: ICD-10-CM

## 2022-04-15 DIAGNOSIS — R73.9: ICD-10-CM

## 2022-04-15 DIAGNOSIS — F43.12: Primary | ICD-10-CM

## 2022-04-15 DIAGNOSIS — R62.7: Primary | ICD-10-CM

## 2022-04-15 DIAGNOSIS — M48.061: ICD-10-CM

## 2022-04-15 LAB
ALBUMIN DIAFP-MCNC: 3.3 G/DL (ref 3.2–5.5)
ALBUMIN/GLOB SERPL: 1.2 {RATIO} (ref 1–2.2)
ALP SERPL-CCNC: 54 IU/L (ref 42–121)
ALT SERPL W P-5'-P-CCNC: 13 IU/L (ref 10–60)
AMPHET UR QL SCN: NEGATIVE
ANION GAP SERPL CALCULATED.4IONS-SCNC: 9 MMOL/L (ref 6–13)
APAP SERPL-MCNC: < 10 UG/ML (ref 10–30)
AST SERPL W P-5'-P-CCNC: 28 IU/L (ref 10–42)
BARBITURATES UR QL SCN>300 NG/ML: NEGATIVE
BASOPHILS NFR BLD AUTO: 0 10^3/UL (ref 0–0.1)
BASOPHILS NFR BLD AUTO: 0.5 %
BENZODIAZ UR QL SCN: NEGATIVE
BILIRUB BLD-MCNC: 0.4 MG/DL (ref 0.2–1)
BUN SERPL-MCNC: 12 MG/DL (ref 6–20)
CALCIUM UR-MCNC: 8.7 MG/DL (ref 8.5–10.3)
CHLORIDE SERPL-SCNC: 99 MMOL/L (ref 101–111)
CLARITY UR REFRACT.AUTO: CLEAR
CO2 SERPL-SCNC: 30 MMOL/L (ref 21–32)
COCAINE UR-SCNC: NEGATIVE UMOL/L
CREAT SERPLBLD-SCNC: 1 MG/DL (ref 0.6–1.2)
CRP SERPL-MCNC: < 1 MG/DL (ref 0–1)
EOSINOPHIL # BLD AUTO: 0.1 10^3/UL (ref 0–0.7)
EOSINOPHIL NFR BLD AUTO: 1.6 %
ERYTHROCYTE [DISTWIDTH] IN BLOOD BY AUTOMATED COUNT: 12.3 % (ref 12–15)
ETHANOL BLD-MCNC: < 5 MG/DL
GFRSERPLBLD MDRD-ARVRAT: 73 ML/MIN/{1.73_M2} (ref 89–?)
GLOBULIN SER-MCNC: 2.7 G/DL (ref 2.1–4.2)
GLUCOSE SERPL-MCNC: 80 MG/DL (ref 70–100)
GLUCOSE UR QL STRIP.AUTO: NEGATIVE MG/DL
HCT VFR BLD AUTO: 44.1 % (ref 42–52)
HGB UR QL STRIP: 14.6 G/DL (ref 14–18)
KETONES UR QL STRIP.AUTO: NEGATIVE MG/DL
LYMPHOCYTES # SPEC AUTO: 0.7 10^3/UL (ref 1.5–3.5)
LYMPHOCYTES NFR BLD AUTO: 8.6 %
MCH RBC QN AUTO: 31.9 PG (ref 27–31)
MCHC RBC AUTO-ENTMCNC: 33.1 G/DL (ref 32–36)
MCV RBC AUTO: 96.3 FL (ref 80–94)
METHADONE UR QL SCN: NEGATIVE
METHAMPHET UR QL SCN: NEGATIVE
MONOCYTES # BLD AUTO: 0.7 10^3/UL (ref 0–1)
MONOCYTES NFR BLD AUTO: 8.9 %
NEUTROPHILS # BLD AUTO: 6.1 10^3/UL (ref 1.5–6.6)
NEUTROPHILS # SNV AUTO: 7.7 X10^3/UL (ref 4.8–10.8)
NEUTROPHILS NFR BLD AUTO: 79.9 %
NITRITE UR QL STRIP.AUTO: NEGATIVE
NRBC # BLD AUTO: 0 /100WBC
NRBC # BLD AUTO: 0 X10^3/UL
OPIATES UR QL SCN: NEGATIVE
PDW BLD AUTO: 10 FL (ref 7.4–11.4)
PH UR STRIP.AUTO: 7.5 PH (ref 5–7.5)
PLATELET # BLD: 148 10^3/UL (ref 130–450)
POTASSIUM SERPL-SCNC: 3.4 MMOL/L (ref 3.5–5)
PROT SPEC-MCNC: 6 G/DL (ref 6.7–8.2)
PROT UR STRIP.AUTO-MCNC: NEGATIVE MG/DL
RBC # UR STRIP.AUTO: NEGATIVE /UL
RBC MAR: 4.58 10^6/UL (ref 4.7–6.1)
SALICYLATES SERPL-MCNC: < 6 MG/DL
SODIUM SERPLBLD-SCNC: 138 MMOL/L (ref 135–145)
SP GR UR STRIP.AUTO: 1.01 (ref 1–1.03)
THC UR QL SCN: NEGATIVE
TSH SERPL-ACNC: 1.24 UIU/ML (ref 0.34–5.6)
UROBILINOGEN UR QL STRIP.AUTO: (no result) E.U./DL
UROBILINOGEN UR STRIP.AUTO-MCNC: NEGATIVE MG/DL
VIT B12 SERPL-MCNC: 233 PG/ML (ref 180–914)
VOLATILE DRUGS POS SERPL SCN: (no result)

## 2022-04-15 PROCEDURE — 81599 UNLISTED MAAA: CPT

## 2022-04-15 PROCEDURE — 80053 COMPREHEN METABOLIC PANEL: CPT

## 2022-04-15 PROCEDURE — 87086 URINE CULTURE/COLONY COUNT: CPT

## 2022-04-15 PROCEDURE — 90834 PSYTX W PT 45 MINUTES: CPT

## 2022-04-15 PROCEDURE — 85651 RBC SED RATE NONAUTOMATED: CPT

## 2022-04-15 PROCEDURE — 86704 HEP B CORE ANTIBODY TOTAL: CPT

## 2022-04-15 PROCEDURE — 83690 ASSAY OF LIPASE: CPT

## 2022-04-15 PROCEDURE — 36415 COLL VENOUS BLD VENIPUNCTURE: CPT

## 2022-04-15 PROCEDURE — 84443 ASSAY THYROID STIM HORMONE: CPT

## 2022-04-15 PROCEDURE — 81003 URINALYSIS AUTO W/O SCOPE: CPT

## 2022-04-15 PROCEDURE — 87635 SARS-COV-2 COVID-19 AMP PRB: CPT

## 2022-04-15 PROCEDURE — 80320 DRUG SCREEN QUANTALCOHOLS: CPT

## 2022-04-15 PROCEDURE — 99283 EMERGENCY DEPT VISIT LOW MDM: CPT

## 2022-04-15 PROCEDURE — 82607 VITAMIN B-12: CPT

## 2022-04-15 PROCEDURE — 85025 COMPLETE CBC W/AUTO DIFF WBC: CPT

## 2022-04-15 PROCEDURE — 80307 DRUG TEST PRSMV CHEM ANLYZR: CPT

## 2022-04-15 PROCEDURE — 80329 ANALGESICS NON-OPIOID 1 OR 2: CPT

## 2022-04-15 PROCEDURE — 87389 HIV-1 AG W/HIV-1&-2 AB AG IA: CPT

## 2022-04-15 PROCEDURE — 83036 HEMOGLOBIN GLYCOSYLATED A1C: CPT

## 2022-04-15 PROCEDURE — 86038 ANTINUCLEAR ANTIBODIES: CPT

## 2022-04-15 PROCEDURE — 86140 C-REACTIVE PROTEIN: CPT

## 2022-04-15 PROCEDURE — 93005 ELECTROCARDIOGRAM TRACING: CPT

## 2022-04-15 PROCEDURE — 72110 X-RAY EXAM L-2 SPINE 4/>VWS: CPT

## 2022-04-15 PROCEDURE — 86480 TB TEST CELL IMMUN MEASURE: CPT

## 2022-04-15 PROCEDURE — 81001 URINALYSIS AUTO W/SCOPE: CPT

## 2022-04-15 PROCEDURE — 80306 DRUG TEST PRSMV INSTRMNT: CPT

## 2022-04-15 NOTE — ED PHYSICIAN DOCUMENTATION
PD HPI MHE





- Stated complaint


Stated Complaint: MHE





- Chief complaint


Chief Complaint: MHE





- History obtained from


History obtained from: Patient





- Additional information


Additional information: 





76-year-old gentleman presents by private vehicle accompanied by friend wanting 

to be admitted for an exacerbation of PTSD.  Since.  He has been compliant with 

his medications and denies suicidal or homicidal ideation..  This afternoon has 

been having severe flashbacks of [place].





Review of Systems


Ten Systems: 10 systems reviewed and negative


Throat: denies: Dental pain / toothache


Cardiac: denies: Chest pain / pressure, Palpitations


Respiratory: denies: Dyspnea, Cough





PD PAST MEDICAL HISTORY





- Past Medical History


Cardiovascular: Hypertension, High cholesterol, MI


Respiratory: None


Neuro: Seizure disorder


Endocrine/Autoimmune: None


GI: GERD


: None


HEENT: None


Psych: Depression, Anxiety, Panic attacks, Post traumatic stress disorder


Musculoskeletal: None


Derm: None





- Past Surgical History


Past Surgical History: Yes


General: Other


Cardiovascular: Coronary stent





- Present Medications


Home Medications: 


                                Ambulatory Orders











 Medication  Instructions  Recorded  Confirmed


 


Atorvastatin Calcium 80 mg PO DAILY 03/04/16 02/22/22


 


Levetiracetam [Keppra] 750 mg PO BID 03/04/16 02/22/22


 


Metoprolol Tartrate 12.5 mg PO BID 03/04/16 02/22/22


 


Fluoride (Sodium) [Prevident 5000 1 applic TOP DAILY PM 12/27/20 02/22/22





Dry Mouth]   


 


Ibuprofen [Motrin] 600 mg PO Q6H PRN 12/27/20 02/22/22


 


Apixaban [Eliquis] 5 mg PO DAILY 02/22/22 02/22/22


 


Aspirin EC [Ecotrin] 81 mg PO DAILY 02/22/22 02/22/22


 


Escitalopram [Lexapro] 40 mg PO DAILY 02/22/22 02/22/22


 


Pantoprazole [Protonix] 40 mg PO DAILY 02/22/22 02/22/22


 


Sildenafil Citrate 100 mg PO PRN PRN 02/22/22 02/22/22


 


Baclofen [Lioresal] 10 mg PO TID PRN #10 tablet 02/23/22 














- Allergies


Allergies/Adverse Reactions: 


                                    Allergies











Allergy/AdvReac Type Severity Reaction Status Date / Time


 


fluoxetine AdvReac  Anxiety Verified 04/15/22 15:42


 


oxycodone HCl * AdvReac  Hallucinati Verified 04/15/22 15:42





[From OxyContin]   ons  














- Social History


Does the pt smoke?: No


Smoking Status: Never smoker


Does the pt drink ETOH?: No


Does the pt have substance abuse?: Yes





- Immunizations


Immunizations are current?: Yes





- POLST


Patient has POLST: No





PD ED PE NORMAL





- Vitals


Vital signs reviewed: Yes





- General


General: Alert and oriented X 3, No acute distress





- HEENT


HEENT: PERRL, EOMI





- Neck


Neck: Supple, no meningeal sign, No bony TTP





- Cardiac


Cardiac: RRR (3 out of 6 systolic murmur decrescendo heard best at the left 

upper sternal border)





- Respiratory


Respiratory: No respiratory distress, Clear bilaterally





- Abdomen


Abdomen: Soft, Non tender





- Back


Back: No CVA TTP, No spinal TTP





- Derm


Derm: Normal color, Warm and dry, No rash





- Extremities


Extremities: No edema, No calf tenderness / cord





- Neuro


Neuro: Alert and oriented X 3, Normal speech





- Psych


Psych: Other (Poor eye contact)





Results





- Vitals


Vitals: 


                               Vital Signs - 24 hr











  04/15/22 04/15/22 04/15/22





  15:39 15:50 16:42


 


Temperature 36.5 C  


 


Heart Rate 65 65 62


 


Respiratory 16 20 18





Rate   


 


Blood Pressure 166/100 H 166/100 H 


 


O2 Saturation 100 99 99








                                     Oxygen











O2 Source                      Room air

















- EKG (time done)


  ** 1551


Rate: Rate (enter#) (65)


Rhythm: NSR


Axis: Normal


Intervals: Normal NM


QRS: Normal


Ischemia: Non specific changes (J point elevation V1-V3, flat inferior T waves)


Compare to prior EKG: Unchanged from prior EKG (No change from 4/15/22)


Computer interpretation: Agree with computer





- Labs


Labs: 


                                Laboratory Tests











  04/15/22 04/15/22 04/15/22





  15:52 15:54 16:05


 


Urine Color    YELLOW


 


Urine Clarity    CLEAR


 


Urine pH    7.5


 


Ur Specific Gravity    1.015


 


Urine Protein    NEGATIVE


 


Urine Glucose (UA)    NEGATIVE


 


Urine Ketones    NEGATIVE


 


Urine Occult Blood    NEGATIVE


 


Urine Nitrite    NEGATIVE


 


Urine Bilirubin    NEGATIVE


 


Urine Urobilinogen    0.2 (NORMAL)


 


Ur Leukocyte Esterase    NEGATIVE


 


Ur Microscopic Review    NOT INDICATED


 


Urine Culture Comments    NOT INDICATED


 


Salicylates   < 6.0 


 


Urine Opiates Screen    NEGATIVE


 


Ur Oxycodone Screen    NEGATIVE


 


Urine Methadone Screen    NEGATIVE


 


Ur Propoxyphene Screen    NEGATIVE


 


Acetaminophen   < 10 L 


 


Ur Barbiturates Screen    NEGATIVE


 


Ur Tricyclics Screen    NEGATIVE


 


Ur Phencyclidine Scrn    NEGATIVE


 


Ur Amphetamine Screen    NEGATIVE


 


U Methamphetamines Scrn    NEGATIVE


 


U Benzodiazepines Scrn    NEGATIVE


 


Urine Cocaine Screen    NEGATIVE


 


U Cannabinoids Screen    NEGATIVE


 


Ethyl Alcohol   < 5.0 


 


SARS-CoV-2 (PCR)  NOT DETECTED  














PD MEDICAL DECISION MAKING





- ED course


ED course: 





He had outpatient labs done earlier in the day including CBC, CMP and TSH.  

These were within normal limits except for borderline low potassium 3.4, 

borderline low chloride at 99.  His TSH was 1.24, normal.  CBC unremarkable.





76-year-old gentleman presents with an exacerbation of PTSD although he was 

feeling better here.  Initially wanted to be hospitalized, but we had telepsych 

see him who felt like he did not need to be hospitalized but did recommend 

dropping his Lexapro dose from 40 mg to 30 mg a day.  In the long run he may 

need something like Seroquel or Zyprexa but Dr. Valiente, tele-psychiatrist wanted 

to see what dropping the Lexapro did first with close outpatient follow-up.  Per

Dr. Valiente both patient and wife comfortable discharge.





Departure





- Departure


Disposition: 01 Home, Self Care


Clinical Impression: 


 Psychiatric symptoms, PTSD (post-traumatic stress disorder)





Condition: Stable


Instructions:  ED Panic Attack


Comments: 


Per Dr Valiente, psychiatrist, decrease lexapro dose to 30mg a day.


Followup with your psychaitrist, call monday for next available appointment.


Return if worse.

## 2022-04-15 NOTE — TELEPSYCH PHYS NOTE
Telepsych Consultation Note


Consult: 











Name: KIERA OROB: 1945





DateandTime: 4/15/2022 8:26:02 PM





[facility]LL[facility] Melchor





Length of consult: 45 min








This evaluation was conducted via video telepsychiatry with the assistance of 

onsite staff





Reason for consult: exacerbation of PTSD





Requested by: DR. ECHEVERRIA





History of Present Illness: The patient is a 76-year-old male with a history of 

PTSD who presents to the ER complaining of worsening PTSD symptoms. When seen by

psychiatry, the patient reported he had a nap earlier today and during the nap 

he had a vivid dream her flashback detailing events that occurred when the 

patient was in the [place]. The patient states that this has happened in the

past but hasn't happened in the past several weeks. Patient denied suicidal or 

homicidal ideations but he reported having one or more entities inside his body 

trying to control his mind. The patient reports that the entities don't talk to 

him and have not succeeded and taken over his body as a patient is constantly 

vigilant. The wife was called. She was not aware of the entities and she has 

seen no concern behavior in that regard but the wife has noticed the patient is 

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





Collateral Contacted: Chelle name:Massiel phone number:[phone] relationship to the patient:wife





Sleep issues?: YesSleep Quantity:wanting to sleep inSleep Quality:waking up 

an hour earlier than usual





Psychiatric History/Treatment History:





Past diagnoses: PTSD





Hospitalizations: YesDescription:one prior admission several years ago





Current Treatment:YesMedication management:YesMedications:Receives Lexapro 

from PCPTherapy:YesTherapyDesc:[facility]








Suicide Assessment:








PSS-3:








1) Over the past 2 weeks have you felt down, depressed or hopeless?Yes


2) Over the past 2 weeks have you had thoughts of killing yourself?No


3) Have you ever in your life attempted to kill yourself?No


Within the past 6 months?








[facility]





Risk Factors





Stressors: See HPI








Attempts/Self-injury: No








Impulsivity:No








Drug/Alcohol History:YesDescription:Alcohol-sober for 38 yrs








Trauma History:YesDescription:








Access to firearms:No








HI/Violence/Property destruction:No








Legal: No








Family Psych History:No








   Family History of suicide:No





Protective Factors:





   Can handle stress well?Yes 





   Confucianist?No 





External:





   Social supports/ Therapeutic relationships: YesDescription:





   Relationship history: 





   Living situation: lives with wife





   Employment: No





   Education: HS grad, no college





   Responsibility to family/children/work: YesDescription:





   Future orientation:YesDescription:





Health History:





   Medical History: Seizure Disorder, GERD





   Medications & Freq: Lexapro 40 mg daily, atorvastatin 80 mg daily, Keppra 750

mg BID, metoprolol 12.5 mg PO BID, Aspirin 81 mg daily, protonic 40 mg daily





   Allergies: Fluoxetine, oxycodone





Mental Status Exam:





Appearance and Attire:Good eye contact





Psychomotor agitation:No abnormality





Attitude and behavior:Restless





Speech:No abnormality,





Mood:Anxious





Affect:Constricted





Thought process:Coherent





Thought content:Delusions, feels something inside his body wants to take 

control





Perception:no AVH





Intel:Average





Abstract:Appropriate





Language:No abnormality





Orientation:Oriented to person, Oriented to place, Oriented to situation, knew 

year but not month or season





Sense:Normal





Knowledge:Mild impairment





Memory:Impaired to Immediate recall, Impaired to Recent recall (3 min), 

Impaired to Remote recall, Can count backwards by 3s





Insight:Lack of awareness of problems





Judgement:Mild impairment





Gait:No abnormality





Impression/Risk Assessment:





Current Suicide Risk Elevated?No 





Current Violence Risk Elevated?No 





Issues with ability to care for self?No 





Summary: The patient presented to the ER with exacerbation of PTSD symptoms and 

vague, bizarre delusions. The patient denies AVH, SI, or HI. There is no 

evidence of dangerousness to self or others. The patient is on a high dose of 

Lexapro (especially considering his age). Current symptoms may be due to this 

medication and symptoms may improve if the dose is lowered. No indication for 

inpatient psychiatric care. Outpatient care recommended.





Diagnosis: F43.12 Post-traumatic stress disorder, chronic, Unspecified Psychotic

Disorder





CPT Codes: 44509 - Psychiatric Diagnostic Evaluation with Medical Services





Treatment Plan:





   General:





   Level of Care: Outpatient care





   Psychiatric Clearance: Yes





   Observation level  1:1 needed?: No





   Pharmacological: Start Lexapro 30 mg daily. The patient will follow-up with 

his PCP. The patient and wife were told the instructions.





   Patient psychotic?YesWas a standing psychotic ordered?Description:





   Therapy: Supportive





   Follow up needed while in the hospital?: No





   Discussed plan with onsite team member: Yes Who Dr. Echeverria





   Other:


Gus Valiente MD


[facility]




















List names and roles of persons who participated in consult: Gus Valiente MD.  

[facility]

## 2022-04-18 LAB
HIV AG/AB 4TH GEN: (no result)
MITOGEN-NIL: 4.39 IU/ML
NIL: 0.02 IU/ML
TB1-NIL: 0 IU/ML
TB2-NIL: 0 IU/ML

## 2022-04-19 LAB
ANA SER QL: POSITIVE
ANA TITR SER: (no result) TITER

## 2022-04-21 ENCOUNTER — HOSPITAL ENCOUNTER (OUTPATIENT)
Dept: HOSPITAL 76 - LAB | Age: 77
Discharge: HOME | End: 2022-04-21
Attending: NURSE PRACTITIONER
Payer: MEDICARE

## 2022-04-21 DIAGNOSIS — R91.8: ICD-10-CM

## 2022-04-21 DIAGNOSIS — R63.4: Primary | ICD-10-CM

## 2022-04-21 LAB
BUN SERPL-MCNC: 14 MG/DL (ref 6–20)
CREAT SERPLBLD-SCNC: 0.9 MG/DL (ref 0.6–1.2)
GFRSERPLBLD MDRD-ARVRAT: 82 ML/MIN/{1.73_M2} (ref 89–?)

## 2022-04-21 PROCEDURE — 82565 ASSAY OF CREATININE: CPT

## 2022-04-21 PROCEDURE — 84520 ASSAY OF UREA NITROGEN: CPT

## 2022-04-21 PROCEDURE — 36415 COLL VENOUS BLD VENIPUNCTURE: CPT

## 2022-04-21 PROCEDURE — 74177 CT ABD & PELVIS W/CONTRAST: CPT

## 2022-04-21 NOTE — CT REPORT
PROCEDURE:  Abdomen/Pelvis W

 

INDICATIONS:  ABN WEIGHT LOSS

 

CONTRAST:  IV CONTRAST: Optiray 320 ml: 100 PO CONTRAST: Optiray 320 ml50 

 

TECHNIQUE:  

After the administration of oral and intravenous contrast, 5 mm thick sections acquired from the diap
hragms to the symphysis.  5 mm thick coronal and sagittal reformats were acquired.  For radiation dos
e reduction, the following was used:  automated exposure control, adjustment of mA and/or kV accordin
g to patient size.  

 

COMPARISON:  CT chest 2/26/2022, CT abdomen/pelvis 4/27/2021.

 

FINDINGS:  

Image quality:  Excellent.  

 

ABDOMEN:  

Lung bases: Nodular opacities are seen in the included lateral left upper lobe. Heart size is normal.
  

 

Solid organs:  Liver and spleen are normal in size and enhancement.  Gallbladder is unremarkable.  Bi
liary system is non dilated.  Pancreas enhances normally.  No adrenal nodules.  Kidneys demonstrate n
ormal size and enhancement, without hydronephrosis.  A 3.6 cm simple cyst is seen at the superior allen
e of the left kidney. Additional smaller renal cysts are noted. 

 

Peritoneum and bowel:  Bowel loops demonstrate normal wall thickness and caliber.  No free fluid or a
ir.  Stable 2.5 cm ovoid peripherally calcified lesion in the left anterior peritoneal cavity, possib
ly the sequela of a prior omental infarct.

 

Nodes and vessels:  No retroperitoneal or mesenteric adenopathy by size criteria.  Aorta and inferior
 vena cava are normal in size. Moderate to severe aortic atherosclerotic calcifications. The iliac ve
ssels are tortuous. 

 

Miscellaneous:  No ventral hernias.  

 

 

PELVIS:  

Genitourinary:  Bladder wall thickness is normal.  

 

Miscellaneous: Prior bilateral inguinal hernia repairs.

 

Bones:  No suspicious bony lesions.  No vertebral body compression fractures.  Degenerative changes a
re seen in the spine.

 

IMPRESSION:  

1.No significant abnormality is seen in the abdomen or pelvis to account for the reported symptoms. 

 

2.Nodular opacities are partially imaged in the left upper lobe. Recommend follow-up chest CT as stephen
mmended on the exam from 2/26/2022.

 

Reviewed by: Eyad Wong MD on 4/21/2022 12:42 PM PDT

Approved by: Eyad Wong MD on 4/21/2022 12:42 PM PDT

 

 

Station ID:  [id number]

## 2022-05-26 ENCOUNTER — HOSPITAL ENCOUNTER (OUTPATIENT)
Dept: HOSPITAL 76 - DI | Age: 77
Discharge: HOME | End: 2022-05-26
Attending: NURSE PRACTITIONER
Payer: MEDICARE

## 2022-05-26 DIAGNOSIS — R91.8: Primary | ICD-10-CM

## 2022-05-26 NOTE — CT REPORT
PROCEDURE:  CHEST WO

 

INDICATIONS:  ABN IMAGING OF LUNG

 

TECHNIQUE: 

Noncontrast 1mm axial images were acquired from the pulmonary apices to the posterior costophrenic an
gles.  Axial 5 mm soft tissue kernel reconstructions were performed as well as 8 mm axial MIP and cor
onal and sagittal 5 mm reformations. For radiation dose reduction, the following was used: automate
d exposure control, adjustment of mA and/or kV according to patient size.

 

COMPARISON:  CT abdomen and pelvis dated 4/21/2022

 

FINDINGS:  

Image quality:  Excellent.  

 

Lungs and pleura: Nodular opacities are as follows:

1. Spiculated mass, left upper lobe, image 178/4, measuring 1.7 cm, suspicious for bronchogenic carci
noma.

2. Slightly more anteriorly, there is a second spiculated mass, left upper lobe, measuring approximat
ely 1.1 cm in maximum diameter. Reference image 192/4.

3. On image 73/4, there are 3 separate nodular densities in the right upper lobe. These include a kat
e solid pulmonary nodule most medially, measuring 0.9 cm, a posterior pleural-based nodule with ill-d
efined margins measuring 1.3 cm in diameter, and a lateral some solid pulmonary nodule with ill-defin
ed borders abutting the pleura measuring 0.9 cm.

4. On image 92/4, in the posterior aspect of the right upper lobe, is a ill-defined subsolid pulmonar
y nodule measuring 2.2 cm. No pleural effusions or pneumothorax.  Central and peripheral airways are 
patent and normal in caliber.  

 

Mediastinum:  Heart size is normal.  No pericardial effusion. Severe coronary artery calcifications. 
Normal heart size. No mediastinal adenopathy by size criteria.  Thoracic aorta and central pulmonary 
arteries are normal in size.  Esophagus is normal in caliber.  No hiatal hernia.  

 

Bones and chest wall:  No suspicious bony lesions. Mild anterior wedging of multiple contiguous thora
cic vertebral bodies results in mild increased thoracic kyphosis. No axillary or supraclavicular ulises
opathy by size criteria.  Thyroid is unremarkable as visualized.

 

Abdomen:  Visualized upper abdominal solid organs and bowel loops appear normal in the absence of con
trast.  

 

IMPRESSION:  

 

1. There are 6 separate suspicious lesions, 2 of which are on the left and 4 of which are on the righ
t. There are all in the upper lobes. These can conceivably all represent synchronous primary bronchog
enic carcinomas. Alternatively, they can represent foci of atypical infection.

 

2. Severe coronary artery disease.

 

3. Presumed osteoporosis.

 

Comment: Recommend referral to a pulmonary specialist. Consider short-term interval follow-up with CT
 in approximately 3 months. Patient may eventually require PET/CT and eventual possible percutaneous 
biopsy, depending upon imaging follow-up and recommendations from the pulmonologist.

 

 

Reviewed by: Liam Huddleston MD on 5/26/2022 11:51 AM PDT

Approved by: Liam Huddleston MD on 5/26/2022 11:51 AM PDT

 

 

Station ID:  [id number]

## 2022-06-29 ENCOUNTER — HOSPITAL ENCOUNTER (OUTPATIENT)
Dept: HOSPITAL 76 - EMS | Age: 77
End: 2022-06-29
Payer: OTHER GOVERNMENT

## 2022-06-29 ENCOUNTER — HOSPITAL ENCOUNTER (EMERGENCY)
Dept: HOSPITAL 76 - ED | Age: 77
Discharge: HOME | End: 2022-06-29
Payer: MEDICARE

## 2022-06-29 VITALS — SYSTOLIC BLOOD PRESSURE: 142 MMHG | DIASTOLIC BLOOD PRESSURE: 85 MMHG

## 2022-06-29 DIAGNOSIS — K59.00: Primary | ICD-10-CM

## 2022-06-29 DIAGNOSIS — K64.9: ICD-10-CM

## 2022-06-29 DIAGNOSIS — K59.02: Primary | ICD-10-CM

## 2022-06-29 PROCEDURE — 96372 THER/PROPH/DIAG INJ SC/IM: CPT

## 2022-06-29 PROCEDURE — 99282 EMERGENCY DEPT VISIT SF MDM: CPT

## 2022-06-29 PROCEDURE — 99283 EMERGENCY DEPT VISIT LOW MDM: CPT

## 2022-06-29 NOTE — ED PHYSICIAN DOCUMENTATION
PD HPI ABD PAIN





- Stated complaint


Stated Complaint: CONSTIPATION





- Chief complaint


Chief Complaint: Abd Pain





- History obtained from


History obtained from: Patient





- History of Present Illness


Timing - onset: Today


Timing - duration: Days (1)


Timing - details: Gradual onset


Quality: Aching (pain in rectum; denies abd pain. feeling of rectal stool 

fullness that he cannot push out. Tried getting it out himself with finger at 

home. Wife gave an emena, without success.)


Location: Other (just at rectal area)


Radiation: No: Lower back, Left flank, Right flank


Improved by: Other (tried to have BM but not much out. Did get small firm amount

out with digital at home but still feels stool impaction at rectum.)


Associated symptoms: Constipation.  No: Fever, Nausea, Vomiting, Diarrhea, 

Melena, Hematochezia


Similar symptoms before: Has not had sx before


Recently seen: Not recently seen





Review of Systems


Constitutional: denies: Fever, Chills


Nose: denies: Rhinorrhea / runny nose, Congestion


Throat: denies: Sore throat


Cardiac: denies: Chest pain / pressure, Palpitations


Respiratory: denies: Dyspnea, Cough


GI: reports: Constipation.  denies: Abdominal Pain, Nausea, Vomiting, Diarrhea, 

Bloody / black stool


: denies: Dysuria, Frequency





PD PAST MEDICAL HISTORY





- Past Medical History


Cardiovascular: Hypertension, High cholesterol, MI


Respiratory: None


Neuro: Seizure disorder


Endocrine/Autoimmune: None


GI: GERD


: None


HEENT: None


Psych: Depression, Anxiety, Panic attacks, Post traumatic stress disorder


Musculoskeletal: None


Derm: None





- Past Surgical History


Past Surgical History: Yes


General: Other


Cardiovascular: Coronary stent





- Present Medications


Home Medications: 


                                Ambulatory Orders











 Medication  Instructions  Recorded  Confirmed


 


Atorvastatin Calcium 80 mg PO DAILY 03/04/16 02/22/22


 


Levetiracetam [Keppra] 750 mg PO BID 03/04/16 02/22/22


 


Metoprolol Tartrate 12.5 mg PO BID 03/04/16 02/22/22


 


Fluoride (Sodium) [Prevident 5000 1 applic TOP DAILY PM 12/27/20 02/22/22





Dry Mouth]   


 


Ibuprofen [Motrin] 600 mg PO Q6H PRN 12/27/20 02/22/22


 


Apixaban [Eliquis] 5 mg PO DAILY 02/22/22 02/22/22


 


Aspirin EC [Ecotrin] 81 mg PO DAILY 02/22/22 02/22/22


 


Escitalopram [Lexapro] 40 mg PO DAILY 02/22/22 02/22/22


 


Pantoprazole [Protonix] 40 mg PO DAILY 02/22/22 02/22/22


 


Sildenafil Citrate 100 mg PO PRN PRN 02/22/22 02/22/22


 


Baclofen [Lioresal] 10 mg PO TID PRN #10 tablet 02/23/22 


 


Hydrocortisone Acetate 25 mg RC DAILY 6 Days #6 supp.rect 06/29/22 














- Allergies


Allergies/Adverse Reactions: 


                                    Allergies











Allergy/AdvReac Type Severity Reaction Status Date / Time


 


fluoxetine AdvReac  Anxiety Verified 06/29/22 14:15


 


oxycodone HCl * AdvReac  Hallucinati Verified 06/29/22 14:15





[From OxyContin]   ons  














- Social History


Does the pt smoke?: No


Smoking Status: Never smoker


Does the pt drink ETOH?: No


Does the pt have substance abuse?: Yes





- Immunizations


Immunizations are current?: Yes





- POLST


Patient has POLST: No





PD ED PE NORMAL





- Vitals


Vital signs reviewed: Yes





- General


General: Alert and oriented X 3, No acute distress, Well developed/nourished





- Neck


Neck: Supple, no meningeal sign, No adenopathy





- Cardiac


Cardiac: RRR, No murmur





- Respiratory


Respiratory: Clear bilaterally





- Abdomen


Abdomen: Normal bowel sounds, Soft, Non tender, Non distended, No organomegaly





- Male 


Male : Deferred





- Rectal


Rectal: Other (external inflammed hemorrhoid on left and feeling of internal 

hemorrhoid as well. Very tender. Does not feel thrombosed. Ball of stool about 

tennis ball sized in vault, broken up digitially (with discomfort from that, but

was just few seconds doing it). )





- Derm


Derm: Normal color, Warm and dry





- Extremities


Extremities: No edema, No calf tenderness / cord





- Neuro


Neuro: Alert and oriented X 3, No motor deficit, Normal speech





Results





- Vitals


Vitals: 


                               Vital Signs - 24 hr











  06/29/22 06/29/22





  14:11 16:24


 


Temperature 36.7 C 36.7 C


 


Heart Rate 62 62


 


Respiratory 14 16





Rate  


 


Blood Pressure 107/74 142/85 H


 


O2 Saturation 97 99








                                     Oxygen











O2 Source                      Room air

















PD MEDICAL DECISION MAKING





- ED course


Complexity details: re-evaluated patient (He had some Toradol for pain and also 

a enema that produced some stool out.  He is feeling much more comfortable at 

this time.), considered differential (Rectal exam showed a almost baseball sized

ball of stool in the rectum that was broken up digitally and partly removed.  

Marked tenderness because of hemorrhoids as well.  No thrombosis.  No blood in 

the stool.), d/w patient





Departure





- Departure


Disposition: 01 Home, Self Care


Clinical Impression: 


 Constipation by outlet obstruction, Fecal impaction in rectum





Hemorrhoids


Qualifiers:


 Hemorrhoid type: unspecified Qualified Code(s): K64.9 - Unspecified hemorrhoids





Condition: Stable


Record reviewed to determine appropriate education?: Yes


Instructions:  ED Constipation, ED Hemorrhoids


Follow-Up: 


Rosanna Mckenna ARNP [Primary Care Provider] - 


Prescriptions: 


Hydrocortisone Acetate 25 mg RC DAILY 6 Days #6 supp.rect


Comments: 


You do have some inflamed hemorrhoids at the rectum and just internal at the 

rectum.  This could be accounting for a lot of your discomfort in the area.  

There was some rectal stool impaction as well.  This should come out more easily

now that it is broken up a bit.





I would suggest using some Tylenol every 4-6 hours for pains.  Continue with a 

daily or twice daily stool softener over the next several days to week.





For the hemorrhoids you can use hydrocortisone anti-inflammatory suppository or 

cream daily to help with that.





Recheck if not improved well over the next few days.





I transmitted your prescription to [facility] in [place].


Discharge Date/Time: 06/29/22 16:25

## 2022-07-21 ENCOUNTER — HOSPITAL ENCOUNTER (EMERGENCY)
Dept: HOSPITAL 76 - ED | Age: 77
Discharge: HOME | End: 2022-07-21
Payer: OTHER GOVERNMENT

## 2022-07-21 VITALS — SYSTOLIC BLOOD PRESSURE: 125 MMHG | DIASTOLIC BLOOD PRESSURE: 78 MMHG

## 2022-07-21 DIAGNOSIS — K22.2: Primary | ICD-10-CM

## 2022-07-21 LAB
ANION GAP SERPL CALCULATED.4IONS-SCNC: 8 MMOL/L (ref 6–13)
BASOPHILS NFR BLD AUTO: 0 10^3/UL (ref 0–0.1)
BASOPHILS NFR BLD AUTO: 0.6 %
BUN SERPL-MCNC: 19 MG/DL (ref 6–20)
CALCIUM UR-MCNC: 9.6 MG/DL (ref 8.5–10.3)
CHLORIDE SERPL-SCNC: 101 MMOL/L (ref 101–111)
CO2 SERPL-SCNC: 29 MMOL/L (ref 21–32)
CREAT SERPLBLD-SCNC: 1.1 MG/DL (ref 0.6–1.2)
EOSINOPHIL # BLD AUTO: 0.1 10^3/UL (ref 0–0.7)
EOSINOPHIL NFR BLD AUTO: 1.8 %
ERYTHROCYTE [DISTWIDTH] IN BLOOD BY AUTOMATED COUNT: 12.8 % (ref 12–15)
GFRSERPLBLD MDRD-ARVRAT: 65 ML/MIN/{1.73_M2} (ref 89–?)
GLUCOSE SERPL-MCNC: 108 MG/DL (ref 70–100)
HCT VFR BLD AUTO: 42 % (ref 42–52)
HGB UR QL STRIP: 14.6 G/DL (ref 14–18)
LYMPHOCYTES # SPEC AUTO: 1.2 10^3/UL (ref 1.5–3.5)
LYMPHOCYTES NFR BLD AUTO: 16.7 %
MCH RBC QN AUTO: 31.9 PG (ref 27–31)
MCHC RBC AUTO-ENTMCNC: 34.8 G/DL (ref 32–36)
MCV RBC AUTO: 91.9 FL (ref 80–94)
MONOCYTES # BLD AUTO: 0.7 10^3/UL (ref 0–1)
MONOCYTES NFR BLD AUTO: 9.6 %
NEUTROPHILS # BLD AUTO: 5.1 10^3/UL (ref 1.5–6.6)
NEUTROPHILS # SNV AUTO: 7.1 X10^3/UL (ref 4.8–10.8)
NEUTROPHILS NFR BLD AUTO: 71 %
NRBC # BLD AUTO: 0 /100WBC
NRBC # BLD AUTO: 0 X10^3/UL
PDW BLD AUTO: 9.4 FL (ref 7.4–11.4)
PLATELET # BLD: 143 10^3/UL (ref 130–450)
POTASSIUM SERPL-SCNC: 3.8 MMOL/L (ref 3.5–5)
RBC MAR: 4.57 10^6/UL (ref 4.7–6.1)
SODIUM SERPLBLD-SCNC: 138 MMOL/L (ref 135–145)

## 2022-07-21 PROCEDURE — 85025 COMPLETE CBC W/AUTO DIFF WBC: CPT

## 2022-07-21 PROCEDURE — 80048 BASIC METABOLIC PNL TOTAL CA: CPT

## 2022-07-21 PROCEDURE — 96376 TX/PRO/DX INJ SAME DRUG ADON: CPT

## 2022-07-21 PROCEDURE — 36415 COLL VENOUS BLD VENIPUNCTURE: CPT

## 2022-07-21 PROCEDURE — 96374 THER/PROPH/DIAG INJ IV PUSH: CPT

## 2022-07-21 PROCEDURE — 99282 EMERGENCY DEPT VISIT SF MDM: CPT

## 2022-07-21 NOTE — ED PHYSICIAN DOCUMENTATION
History of Present Illness





- Stated complaint


Stated Complaint: OBJECT IN THROAT





- Chief complaint


Chief Complaint: General





- History obtained from


History obtained from: Patient





- Additonal information


Additional information: 





This is a 76-year-old gentleman who presents by private vehicle feeling like 

something is stuck in his throat.  He was eating lunch today comprised of a 

hamburger and French fries and feels like something is stuck in the mid chest.  

He cannot tolerate his secretions.  This is a recurrent issue, he had an EGD for

this March of last year showin.  Patient with significant scar tissue and angularity of the GE junction, 

however transverse able without any complication.  No significant retained food 

food bolus, phytobezoar at this level.  No significant anatomic stricture or 

obstruction.


2.  Pylorus narrow however able to intubate without any complication, likely a 

consequence of normal contraction.  Reevaluation revealed a widely patent 

pyloric outlet


3.  Inflammatory changes of the first portion of the duodenum


4.  Antrum noted for diffuse gastritis/gastropathy


5.  GE junction changes consistent with reflux.  The surgeon noted a "shelf" at 

this level that could cause food to be hung up.





Review of Systems


Ten Systems: 10 systems reviewed and negative


Cardiac: denies: Palpitations


Respiratory: denies: Dyspnea, Cough





PD PAST MEDICAL HISTORY





- Past Medical History


Cardiovascular: Hypertension, High cholesterol, MI


Respiratory: None


Neuro: Seizure disorder


Endocrine/Autoimmune: None


GI: GERD


: None


HEENT: None


Psych: Depression, Anxiety, Panic attacks, Post traumatic stress disorder


Musculoskeletal: None


Derm: None





- Past Surgical History


Past Surgical History: Yes


General: Other


Cardiovascular: Coronary stent





- Present Medications


Home Medications: 


                                Ambulatory Orders











 Medication  Instructions  Recorded  Confirmed


 


Atorvastatin Calcium 80 mg PO DAILY 16


 


Levetiracetam [Keppra] 750 mg PO BID 16


 


Metoprolol Tartrate 12.5 mg PO BID 16


 


Fluoride (Sodium) [Prevident 5000 1 applic TOP DAILY PM 20





Dry Mouth]   


 


Ibuprofen [Motrin] 600 mg PO Q6H PRN 20


 


Apixaban [Eliquis] 5 mg PO DAILY 22


 


Aspirin EC [Ecotrin] 81 mg PO DAILY 22


 


Escitalopram [Lexapro] 40 mg PO DAILY 22


 


Pantoprazole [Protonix] 40 mg PO DAILY 22


 


Sildenafil Citrate 100 mg PO PRN PRN 22


 


Baclofen [Lioresal] 10 mg PO TID PRN #10 tablet 22 


 


Hydrocortisone Acetate 25 mg RC DAILY 6 Days #6 supp.rect 22 














- Allergies


Allergies/Adverse Reactions: 


                                    Allergies











Allergy/AdvReac Type Severity Reaction Status Date / Time


 


fluoxetine AdvReac  Anxiety Verified 22 15:17


 


oxycodone HCl * AdvReac  Hallucinati Verified 22 15:17





[From OxyContin]   ons  














- Social History


Does the pt smoke?: No


Smoking Status: Never smoker


Does the pt drink ETOH?: No


Does the pt have substance abuse?: Yes





- Immunizations


Immunizations are current?: Yes





- POLST


Patient has POLST: No





PD ED PE NORMAL





- Vitals


Vital signs reviewed: Yes





- General


General: Alert and oriented X 3, Other (He is holding a emesis bag and spitting 

into it, phonation is normal.)





- HEENT


HEENT: PERRL, EOMI





- Neck


Neck: Supple, no meningeal sign, No bony TTP





- Cardiac


Cardiac: RRR, Other (3 out of 6 decrescendo holosystolic murmur)





- Respiratory


Respiratory: No respiratory distress, Clear bilaterally





- Abdomen


Abdomen: Soft, Non tender





- Derm


Derm: Normal color, Warm and dry





- Neuro


Neuro: Alert and oriented X 3, Normal speech





Results





- Vitals


Vitals: 


                               Vital Signs - 24 hr











  22





  15:13 16:30


 


Temperature 36.7 C 


 


Heart Rate 65 67


 


Respiratory 16 17





Rate  


 


Blood Pressure 124/70 125/78


 


O2 Saturation 97 100








                                     Oxygen











O2 Source                      Room air

















- Labs


Labs: 


                                Laboratory Tests











  22





  15:49 15:49


 


WBC  7.1 


 


RBC  4.57 L 


 


Hgb  14.6 


 


Hct  42.0 


 


MCV  91.9 


 


MCH  31.9 H 


 


MCHC  34.8 


 


RDW  12.8 


 


Plt Count  143 


 


MPV  9.4 


 


Neut # (Auto)  5.1 


 


Lymph # (Auto)  1.2 L 


 


Mono # (Auto)  0.7 


 


Eos # (Auto)  0.1 


 


Baso # (Auto)  0.0 


 


Absolute Nucleated RBC  0.00 


 


Nucleated RBC %  0.0 


 


Sodium   138


 


Potassium   3.8


 


Chloride   101


 


Carbon Dioxide   29


 


Anion Gap   8.0


 


BUN   19


 


Creatinine   1.1


 


Estimated GFR (MDRD)   65 L


 


Glucose   108 H


 


Calcium   9.6














PD MEDICAL DECISION MAKING





- ED course


ED course: 





76-year-old gentleman presents with an esophageal food impaction, initially 

given 2 mg of glucagon and did vomit up some food, but was still unable to 

tolerate his secretions, a bolus of glucagon was repeated and he felt like the 

food bolus resolved and passed a p.o. challenge.





Departure





- Departure


Disposition: 01 Home, Self Care


Clinical Impression: 


 Esophageal obstruction due to food impaction





Condition: Good


Record reviewed to determine appropriate education?: Yes


Instructions:  ED Foreign Body Esophageal Rslv


Comments: 


Chew your food well, follow-up with your primary care physician, next available 

appointment

## 2022-10-09 ENCOUNTER — HOSPITAL ENCOUNTER (OUTPATIENT)
Dept: HOSPITAL 76 - EMS | Age: 77
Discharge: ANOTHER HOSPITAL | End: 2022-10-09
Attending: EMERGENCY MEDICINE
Payer: OTHER GOVERNMENT

## 2022-10-09 ENCOUNTER — HOSPITAL ENCOUNTER (OUTPATIENT)
Dept: HOSPITAL 76 - EMS | Age: 77
Discharge: ANOTHER HOSPITAL | End: 2022-10-09
Payer: OTHER GOVERNMENT

## 2022-10-09 ENCOUNTER — HOSPITAL ENCOUNTER (EMERGENCY)
Dept: HOSPITAL 76 - ED | Age: 77
Discharge: ANOTHER HOSPITAL | End: 2022-10-09
Payer: OTHER GOVERNMENT

## 2022-10-09 VITALS — DIASTOLIC BLOOD PRESSURE: 76 MMHG | SYSTOLIC BLOOD PRESSURE: 117 MMHG

## 2022-10-09 DIAGNOSIS — I10: ICD-10-CM

## 2022-10-09 DIAGNOSIS — I25.2: ICD-10-CM

## 2022-10-09 DIAGNOSIS — Z79.01: ICD-10-CM

## 2022-10-09 DIAGNOSIS — Z20.822: ICD-10-CM

## 2022-10-09 DIAGNOSIS — I21.4: Primary | ICD-10-CM

## 2022-10-09 DIAGNOSIS — R07.89: Primary | ICD-10-CM

## 2022-10-09 LAB
ALBUMIN DIAFP-MCNC: 3.2 G/DL (ref 3.2–5.5)
ALBUMIN/GLOB SERPL: 1 {RATIO} (ref 1–2.2)
ALP SERPL-CCNC: 59 IU/L (ref 42–121)
ALT SERPL W P-5'-P-CCNC: 14 IU/L (ref 10–60)
ANION GAP SERPL CALCULATED.4IONS-SCNC: 10 MMOL/L (ref 6–13)
AST SERPL W P-5'-P-CCNC: 33 IU/L (ref 10–42)
B PARAPERT DNA SPEC QL NAA+PROBE: NOT DETECTED
B PERT DNA SPEC QL NAA+PROBE: NOT DETECTED
BASOPHILS NFR BLD AUTO: 0.1 10^3/UL (ref 0–0.1)
BASOPHILS NFR BLD AUTO: 0.8 %
BILIRUB BLD-MCNC: 1.2 MG/DL (ref 0.2–1)
BUN SERPL-MCNC: 19 MG/DL (ref 6–20)
C PNEUM DNA NPH QL NAA+NON-PROBE: NOT DETECTED
CALCIUM UR-MCNC: 8.4 MG/DL (ref 8.5–10.3)
CHLORIDE SERPL-SCNC: 104 MMOL/L (ref 101–111)
CO2 SERPL-SCNC: 25 MMOL/L (ref 21–32)
CREAT SERPLBLD-SCNC: 1 MG/DL (ref 0.6–1.2)
EOSINOPHIL # BLD AUTO: 0.4 10^3/UL (ref 0–0.7)
EOSINOPHIL NFR BLD AUTO: 6.1 %
ERYTHROCYTE [DISTWIDTH] IN BLOOD BY AUTOMATED COUNT: 12.4 % (ref 12–15)
FLUAV RNA RESP QL NAA+PROBE: NOT DETECTED
GFRSERPLBLD MDRD-ARVRAT: 73 ML/MIN/{1.73_M2} (ref 89–?)
GLOBULIN SER-MCNC: 3.2 G/DL (ref 2.1–4.2)
GLUCOSE SERPL-MCNC: 88 MG/DL (ref 70–100)
HAEM INFLU B DNA SPEC QL NAA+PROBE: NOT DETECTED
HCOV 229E RNA SPEC QL NAA+PROBE: NOT DETECTED
HCOV HKU1 RNA UPPER RESP QL NAA+PROBE: NOT DETECTED
HCOV NL63 RNA ASPIRATE QL NAA+PROBE: NOT DETECTED
HCOV OC43 RNA SPEC QL NAA+PROBE: NOT DETECTED
HCT VFR BLD AUTO: 40.7 % (ref 42–52)
HGB UR QL STRIP: 13.7 G/DL (ref 14–18)
HMPV AG SPEC QL: NOT DETECTED
HPIV1 RNA NPH QL NAA+PROBE: NOT DETECTED
HPIV2 SPEC QL CULT: NOT DETECTED
HPIV3 AB TITR SER CF: NOT DETECTED {TITER}
HPIV4 RNA SPEC QL NAA+PROBE: NOT DETECTED
LIPASE SERPL-CCNC: 30 U/L (ref 22–51)
LYMPHOCYTES # SPEC AUTO: 1.2 10^3/UL (ref 1.5–3.5)
LYMPHOCYTES NFR BLD AUTO: 19.9 %
M PNEUMO DNA SPEC QL NAA+PROBE: NOT DETECTED
MCH RBC QN AUTO: 31.4 PG (ref 27–31)
MCHC RBC AUTO-ENTMCNC: 33.7 G/DL (ref 32–36)
MCV RBC AUTO: 93.1 FL (ref 80–94)
MONOCYTES # BLD AUTO: 0.9 10^3/UL (ref 0–1)
MONOCYTES NFR BLD AUTO: 13.8 %
NEUTROPHILS # BLD AUTO: 3.7 10^3/UL (ref 1.5–6.6)
NEUTROPHILS # SNV AUTO: 6.2 X10^3/UL (ref 4.8–10.8)
NEUTROPHILS NFR BLD AUTO: 59.1 %
NRBC # BLD AUTO: 0 /100WBC
NRBC # BLD AUTO: 0 X10^3/UL
PDW BLD AUTO: 9.8 FL (ref 7.4–11.4)
PLATELET # BLD: 134 10^3/UL (ref 130–450)
POTASSIUM SERPL-SCNC: 4.6 MMOL/L (ref 3.5–5)
PROT SPEC-MCNC: 6.4 G/DL (ref 6.7–8.2)
RBC MAR: 4.37 10^6/UL (ref 4.7–6.1)
RSV RNA RESP QL NAA+PROBE: NOT DETECTED
RV+EV RNA SPEC QL NAA+PROBE: NOT DETECTED
SARS-COV-2 RNA PNL SPEC NAA+PROBE: NOT DETECTED
SODIUM SERPLBLD-SCNC: 139 MMOL/L (ref 135–145)

## 2022-10-09 PROCEDURE — 80053 COMPREHEN METABOLIC PANEL: CPT

## 2022-10-09 PROCEDURE — 36415 COLL VENOUS BLD VENIPUNCTURE: CPT

## 2022-10-09 PROCEDURE — 83690 ASSAY OF LIPASE: CPT

## 2022-10-09 PROCEDURE — 93005 ELECTROCARDIOGRAM TRACING: CPT

## 2022-10-09 PROCEDURE — 84484 ASSAY OF TROPONIN QUANT: CPT

## 2022-10-09 PROCEDURE — 71045 X-RAY EXAM CHEST 1 VIEW: CPT

## 2022-10-09 PROCEDURE — 85025 COMPLETE CBC W/AUTO DIFF WBC: CPT

## 2022-10-09 PROCEDURE — 96372 THER/PROPH/DIAG INJ SC/IM: CPT

## 2022-10-09 PROCEDURE — 99285 EMERGENCY DEPT VISIT HI MDM: CPT

## 2022-10-09 PROCEDURE — 99283 EMERGENCY DEPT VISIT LOW MDM: CPT

## 2022-10-09 PROCEDURE — 87633 RESP VIRUS 12-25 TARGETS: CPT

## 2022-10-09 NOTE — ED PHYSICIAN DOCUMENTATION
PD HPI CHEST PAIN





- Stated complaint


Stated Complaint: CHEST PX





- History obtained from


History obtained from: Patient, EMS





- History of Present Illness


Timing - onset: Today


Timing - onset during: Sleep


Timing - duration: Hours (3)


Timing - details: Abrupt onset, Now resolved


Quality: Pressure, Sharp, Pain


Location: Substernal


Radiation: No: Jaw, Neck, Back, Abdominal, Left upper extremity, Right upper 

extremity


Improved by: Other (time)


Worsened by: No: Exertion, Inspiration, Eating, Movement, Palpation, Position


Associated symptoms: No: Shortness of air, Diaphoresis, Nausea, Vomiting, 

Feeling faint / dizzy, General Weakness, Palpitations, Cough


Similar symptoms before: No diagnosis


Recently seen: Not recently seen





- Additional information


Additional information: 





76-year-old Felix Diez he has a prior history of coronary artery disease and 

he has been having episodes of chest pain at night while asleep.  This morning 

he awoke with chest pain and this lasted nearly 3 hours and completely resolved 

when he stepped into the ambulance.  He denies any radiation diaphoresis 

lightheadedness or dyspnea associated with this.  He did not do any exertion 

while he had the pain.  He has had a similar pain the night previously.  He 

denies any current illness.





Review of Systems


Constitutional: denies: Fever


Ears: denies: Ear pain


Nose: denies: Congestion


Throat: denies: Sore throat


Cardiac: reports: Chest pain / pressure.  denies: Palpitations, Pedal edema, 

Calf pain


Respiratory: denies: Dyspnea, Cough, Wheezing


GI: denies: Abdominal Pain, Nausea, Vomiting, Constipation, Diarrhea


: denies: Dysuria, Frequency


Skin: denies: Rash


Musculoskeletal: denies: Neck pain, Back pain, Extremity pain


Neurologic: denies: Generalized weakness, Focal weakness, Numbness





PD PAST MEDICAL HISTORY





- Past Medical History


Cardiovascular: Hypertension, High cholesterol, MI


Respiratory: None


Neuro: Seizure disorder


Endocrine/Autoimmune: None


GI: GERD


: None


HEENT: None


Psych: Depression, Anxiety, Panic attacks, Post traumatic stress disorder


Musculoskeletal: None


Derm: None





- Past Surgical History


Past Surgical History: Yes


General: Other


Cardiovascular: Coronary stent





- Present Medications


Home Medications: 


                                Ambulatory Orders











 Medication  Instructions  Recorded  Confirmed


 


Atorvastatin Calcium 80 mg PO DAILY 03/04/16 10/09/22


 


Levetiracetam [Keppra] 750 mg PO BID 03/04/16 10/09/22


 


Metoprolol Tartrate 12.5 mg PO BID 03/04/16 10/09/22


 


Fluoride (Sodium) [Prevident 5000 1 applic TOP DAILY PM 12/27/20 10/09/22





Dry Mouth]   


 


Ibuprofen [Motrin] 600 mg PO Q6H PRN 12/27/20 10/09/22


 


Apixaban [Eliquis] 2.5 mg PO DAILY 02/22/22 10/09/22


 


Aspirin EC [Ecotrin] 81 mg PO DAILY 02/22/22 10/09/22


 


Pantoprazole [Protonix] 40 mg PO DAILY 02/22/22 10/09/22


 


Sildenafil Citrate 100 mg PO PRN PRN 02/22/22 10/09/22


 


Baclofen [Lioresal] 10 mg PO TID PRN #10 tablet 02/23/22 10/09/22


 


DULoxetine [Cymbalta] 60 mg PO DAILY 10/09/22 10/09/22


 


QUEtiapine [SEROquel] 25 mg PO BID 10/09/22 10/09/22














- Allergies


Allergies/Adverse Reactions: 


                                    Allergies











Allergy/AdvReac Type Severity Reaction Status Date / Time


 


fluoxetine AdvReac  Anxiety Verified 10/09/22 03:47


 


oxycodone HCl * AdvReac  Hallucinati Verified 10/09/22 03:47





[From OxyContin]   ons  














- Social History


Does the pt smoke?: No


Smoking Status: Never smoker


Does the pt drink ETOH?: No


Does the pt have substance abuse?: Yes





- Immunizations


Immunizations are current?: Yes





- POLST


Patient has POLST: No





PD ED PE NORMAL





- Vitals


Vital signs reviewed: Yes (Normal)





- General


General: Alert and oriented X 3, No acute distress, Well developed/nourished





- HEENT


HEENT: Atraumatic, PERRL, EOMI





- Neck


Neck: Supple, no meningeal sign, No bony TTP





- Cardiac


Cardiac: RRR, Other (no chest wall tenderness. 2/6 holosystolic murmer at LSB 

Machine like quality. )





- Respiratory


Respiratory: No respiratory distress, Clear bilaterally





- Abdomen


Abdomen: Soft, Non tender





- Back


Back: No CVA TTP, No spinal TTP





- Derm


Derm: Normal color, Warm and dry, No rash





- Extremities


Extremities: No deformity, No edema





- Neuro


Neuro: Alert and oriented X 3, CNs 2-12 intact, No motor deficit, No sensory 

deficit, Normal speech


Eye Opening: Spontaneous


Motor: Obeys Commands


Verbal: Oriented


GCS Score: 15





- Psych


Psych: Normal mood, Normal affect





Results





- Vitals


Vitals: 


                               Vital Signs - 24 hr











  10/09/22 10/09/22 10/09/22





  03:43 04:12 06:16


 


Temperature 36.4 C L  


 


Heart Rate 62 63 66


 


Respiratory 16 26 H 28 H





Rate   


 


Blood Pressure 119/80 109/82 H 113/69


 


O2 Saturation 100 100 99








                                     Oxygen











O2 Source                      Room air

















- EKG (time done)


  ** 0346


Rate: Rate (enter#) (59)


Ischemia: ST elevation c/w ischemia (V2 1.5mV V3 0.5mV)


Compare to prior EKG: Unchanged from prior EKG (SPT 4-22-22 no changes )


Computer interpretation: Agree with computer





- Labs


Labs: 


                                Laboratory Tests











  10/09/22 10/09/22 10/09/22





  04:04 04:04 04:04


 


WBC  6.2  


 


RBC  4.37 L  


 


Hgb  13.7 L  


 


Hct  40.7 L  


 


MCV  93.1  


 


MCH  31.4 H  


 


MCHC  33.7  


 


RDW  12.4  


 


Plt Count  134  


 


MPV  9.8  


 


Neut # (Auto)  3.7  


 


Lymph # (Auto)  1.2 L  


 


Mono # (Auto)  0.9  


 


Eos # (Auto)  0.4  


 


Baso # (Auto)  0.1  


 


Absolute Nucleated RBC  0.00  


 


Nucleated RBC %  0.0  


 


Sodium   139 


 


Potassium   4.6 


 


Chloride   104 


 


Carbon Dioxide   25 


 


Anion Gap   10.0 


 


BUN   19 


 


Creatinine   1.0 


 


Estimated GFR (MDRD)   73 L 


 


Glucose   88 


 


Calcium   8.4 L 


 


Total Bilirubin   1.2 H 


 


AST   33 


 


ALT   14 


 


Alkaline Phosphatase   59 


 


Troponin I High Sens    25.7 H*


 


Total Protein   6.4 L 


 


Albumin   3.2 


 


Globulin   3.2 


 


Albumin/Globulin Ratio   1.0 


 


Lipase   30 














  10/09/22





  06:19


 


WBC 


 


RBC 


 


Hgb 


 


Hct 


 


MCV 


 


MCH 


 


MCHC 


 


RDW 


 


Plt Count 


 


MPV 


 


Neut # (Auto) 


 


Lymph # (Auto) 


 


Mono # (Auto) 


 


Eos # (Auto) 


 


Baso # (Auto) 


 


Absolute Nucleated RBC 


 


Nucleated RBC % 


 


Sodium 


 


Potassium 


 


Chloride 


 


Carbon Dioxide 


 


Anion Gap 


 


BUN 


 


Creatinine 


 


Estimated GFR (MDRD) 


 


Glucose 


 


Calcium 


 


Total Bilirubin 


 


AST 


 


ALT 


 


Alkaline Phosphatase 


 


Troponin I High Sens  44.5 H*


 


Total Protein 


 


Albumin 


 


Globulin 


 


Albumin/Globulin Ratio 


 


Lipase 














- Rads (name of study)


  ** chest


Radiology: Prelim report reviewed (Impression: Minimal bilateral subsegmental 

atelectasis and/or scarring.), EMP read indepedently, See rad report





PD MEDICAL DECISION MAKING





- ED course


Complexity details: reviewed old records, reviewed results, re-evaluated 

patient, considered differential, d/w patient


ED course: 





76-year-old male with resolved chest pain has an electrocardiogram that looks 

identical to his prior electrocardiogram but today he has a mild elevation in 

his troponin.  He has a history consistent with a coronary syndrome and has a 

prior history.  He is unable to tell me where or who he saw for his prior heart 

condition. When his wife arrives she is able to fill in some details and thinks 

he was treated at the [facility] where he had 3 stents placed maybe 

more than 20 years ago. 





The patient has further elevation in his troponin consistent with an NSTEMI and 

not a significant amount of troponin.  He is pain-free he is on Eliquis and he 

is given an aspirin today.  Further anticoagulation is not pursued. Blood 

pressure is normal. At shift change his care is turned over to Dr. Ashton while 

we attempt to find a cardiac bed. 











Departure





- Departure


Disposition: 02 Transfer Acute Care Hosp


Clinical Impression: 


 NSTEMI (non-ST elevated myocardial infarction)

## 2022-10-09 NOTE — XRAY REPORT
PROCEDURE:  Chest 1 View X-Ray

 

INDICATIONS:  chest pain

 

TECHNIQUE:  One view of the chest was acquired.  

 

COMPARISON:  2/22/2022. Correlation is also made with chest CT, 5/26/2022

 

FINDINGS:  

 

Surgical changes and devices:  None.  

 

Lungs and pleura:  No pleural effusions or pneumothorax. Minimal streaky opacity can be seen at the l
reina bases.  Lungs are otherwise clear.  

 

Mediastinum:  Mediastinal contours appear normal.  Heart size is normal.  

 

Bones and chest wall:  No suspicious bony lesions.  Age-appropriate degenerative changes are seen.   
Overlying soft tissues appear unremarkable.  

 

 

IMPRESSION:  

 

Nearly normal study, with likely atelectasis at the lung bases.

 

 

Note: No significant discrepancy from the preliminary report.

 

Reviewed by: Luiz Armstrong MD on 10/9/2022 6:57 AM KELIN

Approved by: Luiz Armstrong MD on 10/9/2022 6:57 AM KELIN

 

 

Station ID:  [id number]

## 2022-12-08 ENCOUNTER — HOSPITAL ENCOUNTER (OUTPATIENT)
Dept: HOSPITAL 76 - SDS | Age: 77
Discharge: HOME | End: 2022-12-08
Attending: OPHTHALMOLOGY
Payer: OTHER GOVERNMENT

## 2022-12-08 VITALS — SYSTOLIC BLOOD PRESSURE: 106 MMHG | DIASTOLIC BLOOD PRESSURE: 71 MMHG

## 2022-12-08 DIAGNOSIS — H25.812: Primary | ICD-10-CM

## 2022-12-08 DIAGNOSIS — F41.9: ICD-10-CM

## 2022-12-08 PROCEDURE — 66984 XCAPSL CTRC RMVL W/O ECP: CPT

## 2022-12-08 NOTE — ANESTHESIA POST OP EVALUATION
Anesthesia Post Eval





- Post Anesthesia Eval


Vitals: 





                                Last Vital Signs











Temp  36.5 C   12/08/22 11:10


 


Pulse  62   12/08/22 11:10


 


Resp  16   12/08/22 11:10


 


BP  106/71   12/08/22 11:10


 


Pulse Ox  99   12/08/22 11:10


 


O2 Flow Rate  0   12/08/22 09:25











CV Function Including HR & BP: Stable


Pain Control: Satisfactory


Nausea & Vomiting: Negative


Mental Status: Baseline


Respiratory Status: Airway Patent


Hydration Status: Satisfactory


Anesthesia Complications: None

## 2022-12-08 NOTE — OPERATIVE REPORT
Operative Report





- Other


Other Information/Narrative: 





Date of Surgery: 12/08/22


Preop Dx: Visually significant cataract right eye. This was the first cataract 

surgery.


Postop Dx: Same


Procedure: Phacoemulsification with posterior chamber intraocular lens implant 

right eye


Surgeon: Dr. Steven Hernandez


Anesthesia: Monitored anesthesia care


Complications: None


Operative Indications: This is a 77-year-old M with progressive vision loss in 

the right eye due to 3-4+ nuclear sclerotic and 1+ posterior subcapsular 

cataract.   Best corrected visual acuity was 20/50 with glare to 20/80 vision in

the right eye.  Indications for surgery were:


-   Overall decrease in vision


-   Difficulty seeing words on a computer screen


-   Difficulty reading


-   Difficulty seeing words, closed captions, or game scores on TV


-   Difficulty driving in low light or at night


-   Difficulty driving at night because of headlights from other vehicles


-   Difficulty with glare or bright lights in any situation


-   Difficulty tracking a golf ball


The patient was consented at length concerning the risks and benefits of 

cataract surgery after which the patient expressed a desire to proceed with 

surgery.  





Operative Procedure:  The patient was taken into OR#3 and placed under monitored

anesthesia care.  A surgical time-out was conducted confirming correct patient, 

correct procedure, and correct surgical site.  The patient was given topical 

anesthesia and then prepped and draped in the usual sterile fashion.  The eye 

was entered at the 6 and 3 oclock positions.  Intracameral Shugarcaine was 

injected into the anterior chamber followed by a dispersive viscoelastic. A 

continuous-tear curvilinear capsulorhexis was performed. The nucleus was 

hydrodissected and phacoemulsified.  The cortex was evacuated using automated 

infusion and aspiration.  A cohesive viscoelastic was injected into the capsular

bag and a 23.0 diopter intraocular lens was inserted into the bag. Infusion and 

aspiration were used to evacuate the viscoelastic materials from the eye. The 

wounds were hydrated and the eye inflated to physiologic pressure using balanced

salt solution.  Approximately 0.25ml of a mixture of triamcinolone and 

moxifloxacin was injected trans-sclerally into the vitreous in the 

inferotemporal quadrant using a 30 gauge cannula.  An additional 0.55ml of a m

ixture of triamcinolone and moxifloxacin was injected subconjunctivally in the 

superior quadrant for infection and inflammation prophylaxis. Wound integrity 

was checked with Weck-Yolanda sponges.  The patient was taken from the operating 

room in good condition and given post-op instructions.

## 2022-12-08 NOTE — ANESTHESIA
Pre-Anesthesia VS, & Labs





- Diagnosis





right eye senile combined cataract





- Procedure





right eye cataract extraction with IOL implant


Vital Signs: 





                                        











Temp Pulse Resp BP Pulse Ox O2 Flow Rate


 


 36.5 C   68   12   133/76 H  100   0 


 


 12/08/22 09:25  12/08/22 09:25  12/08/22 09:25  12/08/22 09:25  12/08/22 09:25 

12/08/22 09:25











Height: 6 ft


Weight (kg): 71 kg


Body Mass Index: 21.2


BMI Classification: Normal





- NPO


>8 hours





Home Medications and Allergies





                                        





Atorvastatin Calcium 80 mg PO DAILY 03/04/16 


Levetiracetam [Keppra] 750 mg PO BID 03/04/16 


Metoprolol Tartrate 12.5 mg PO BID 03/04/16 


Fluoride (Sodium) [Prevident 5000 Dry Mouth] 1 applic TOP DAILY PM 12/27/20 


Ibuprofen [Motrin] 600 mg PO Q6H PRN 12/27/20 


Apixaban [Eliquis] 2.5 mg PO DAILY 02/22/22 


Aspirin EC [Ecotrin] 81 mg PO DAILY 02/22/22 


Pantoprazole [Protonix] 40 mg PO DAILY 02/22/22 


Sildenafil Citrate 100 mg PO PRN PRN 02/22/22 


DULoxetine [Cymbalta] 60 mg PO DAILY 10/09/22 


QUEtiapine [SEROquel] 25 mg PO BID 10/09/22 








Allergies/Adverse Reactions: 


                                    Allergies











Allergy/AdvReac Type Severity Reaction Status Date / Time


 


fluoxetine AdvReac  Anxiety Verified 10/09/22 03:47


 


oxycodone HCl * AdvReac  Hallucinati Verified 10/09/22 03:47





[From OxyContin]   ons  














Anes History & Medical History





- Anesthetic History


Anesthesia Complications: reports: No previous complications





- Medical History


Cardiovascular: reports: Hypertension, High cholesterol, MI


Pulmonary: reports: None


Gastrointestinal: reports: GERD


Urinary: reports: None


Neuro: reports: Seizure disorder


Musculoskeletal: reports: None


Endocrine/Autoimmune: reports: None


Blood Disorders: reports: None


Skin: reports: None


Smoking Status: Never smoker


Psychosocial: reports: No issues indicated


History of Cancer?: No





- Surgical History


General: reports: Other


Cardiothoracic: reports: Coronary stent





Exam


General: Alert, Oriented x3, Cooperative, No acute distress


Dental: WNL


Mouth Opening: 3 Fingerbreadth


Neck Mobility: Normal


Mallampati classification: II


Thyromental Distance: 4-6 cm


Mental/Cognitive Status: Alert/Oriented X3, Normal for patient





Plan


Anesthesia Type: MAC


Consent for Procedure(s) Verified and Reviewed: Yes


Code Status: Attempt Resuscitation


ASA classification: 3-Severe systemic disease


Is this case an emergency?: No

## 2023-02-02 ENCOUNTER — HOSPITAL ENCOUNTER (OUTPATIENT)
Dept: HOSPITAL 76 - SDS | Age: 78
Discharge: HOME | End: 2023-02-02
Attending: OPHTHALMOLOGY
Payer: OTHER GOVERNMENT

## 2023-02-02 VITALS — SYSTOLIC BLOOD PRESSURE: 114 MMHG | DIASTOLIC BLOOD PRESSURE: 74 MMHG

## 2023-02-02 DIAGNOSIS — H25.812: Primary | ICD-10-CM

## 2023-02-02 DIAGNOSIS — Z98.41: ICD-10-CM

## 2023-02-02 DIAGNOSIS — I25.2: ICD-10-CM

## 2023-02-02 DIAGNOSIS — N40.0: ICD-10-CM

## 2023-02-02 PROCEDURE — 66984 XCAPSL CTRC RMVL W/O ECP: CPT

## 2023-02-02 NOTE — OPERATIVE REPORT
Operative Report





- Other


Other Information/Narrative: 





Date of Surgery: 02/02/23


Preop Dx: Visually significant cataract left eye. Cataract surgery was performed

in the right eye on 08DEC22.


Postop Dx: Same


Procedure: Phacoemulsification with posterior chamber intraocular lens implant 

left eye


Surgeon: Dr. Steven Hernandez


Anesthesia: Monitored anesthesia care


Complications: None


Operative Indications: This is a 77-year-old M with progressive vision loss in 

the left eye due to 3-4+ nuclear sclerotic and 2+ posterior subcapsular 

cataract.   Best corrected visual acuity was 20/25 with glare to 20/50 vision in

the left eye.  Indications for surgery were:


-   Difficulty reading


-   Difficulty seeing words, closed captions, or game scores on TV


-   Difficulty driving at night because of headlights from other vehicles


-   Difficulty with glare or bright lights in any situation


The patient was consented at length concerning the risks and benefits of 

cataract surgery after which the patient expressed a desire to proceed with s

urgery.  





Operative Procedure:  The patient was taken into OR#3 and placed under monitored

anesthesia care.  A surgical time-out was conducted confirming correct patient, 

correct procedure, and correct surgical site.  The patient was given topical 

anesthesia and then prepped and draped in the usual sterile fashion.  The eye 

was entered at the 6 and 3 oclock positions.  Intracameral Shugarcaine was 

injected into the anterior chamber followed by a dispersive viscoelastic. A 

continuous-tear curvilinear capsulorhexis was performed. The nucleus was 

hydrodissected and phacoemulsified.  The cortex was evacuated using automated 

infusion and aspiration.  A cohesive viscoelastic was injected into the capsular

bag and a 23.5 diopter intraocular lens was inserted into the bag. Infusion and 

aspiration were used to evacuate the viscoelastic materials from the eye. The 

wounds were hydrated and the eye inflated to physiologic pressure using balanced

salt solution.  Approximately 0.25ml of a mixture of triamcinolone and 

moxifloxacin was injected trans-sclerally into the vitreous in the 

inferotemporal quadrant using a 30 gauge cannula.  An additional 0.25ml of a 

mixture of triamcinolone and moxifloxacin was injected subconjunctivally in the 

superior quadrant for infection and inflammation prophylaxis. Wound integrity 

was checked with Weck-Yolanda sponges.  The patient was taken from the operating 

room in good condition and given post-op instructions.

## 2023-02-02 NOTE — ANESTHESIA
Pre-Anesthesia VS, & Labs





- Diagnosis





L cataract





- Procedure





L phacoIOL


Vital Signs: 





                                        











Temp Pulse Resp BP Pulse Ox O2 Flow Rate


 


 36.7 C   73   19   127/85 H  100    


 


 02/02/23 08:42  02/02/23 08:42  02/02/23 08:42  02/02/23 08:42  02/02/23 08:42 

 











Height: 6 ft


Weight (kg): 73 kg


Body Mass Index: 21.8


BMI Classification: Normal





- NPO


>8 hours





Home Medications and Allergies





                                        





Atorvastatin Calcium 80 mg PO DAILY 03/04/16 


Levetiracetam [Keppra] 750 mg PO BID 03/04/16 


Metoprolol Tartrate 12.5 mg PO BID 03/04/16 


Apixaban [Eliquis] 2.5 mg PO DAILY 02/22/22 


Aspirin EC [Ecotrin] 81 mg PO DAILY 02/22/22 


Pantoprazole [Protonix] 40 mg PO DAILY 02/22/22 


Sildenafil Citrate 100 mg PO PRN PRN 02/22/22 


DULoxetine [Cymbalta] 60 mg PO DAILY 10/09/22 


QUEtiapine [SEROquel] 25 mg PO BID 10/09/22 








Allergies/Adverse Reactions: 


                                    Allergies











Allergy/AdvReac Type Severity Reaction Status Date / Time


 


fluoxetine AdvReac  Anxiety Verified 10/09/22 03:47


 


oxycodone HCl * AdvReac  Hallucinati Verified 10/09/22 03:47





[From OxyContin]   ons  














Anes History & Medical History





- Anesthetic History


Anesthesia Complications: reports: No previous complications


Family history of Anesthesia Complications: Denies


Family history of Malignant Hyperthermia: Denies





- Medical History


Cardiovascular: reports: Hypertension, High cholesterol, MI, Other (3 cardiac 

stents pplaced in 1997 MI 01/23, no intervention, medically managed)


Pulmonary: reports: None


Gastrointestinal: reports: GERD, Hiatal hernia


Urinary: reports: None


Neuro: reports: Seizure disorder


Musculoskeletal: reports: None


Endocrine/Autoimmune: reports: None


Blood Disorders: reports: None


Skin: reports: None


Smoking Status: Never smoker


Psychosocial: reports: Delusions (pt has periods of time when he 

"psychologically leaves his body and has to be 'guided' back in and reoriented")





- Surgical History


General: reports: Other


Cardiothoracic: reports: Coronary stent





Exam


General: Alert, Oriented x3, Cooperative


Dental: WNL


Mouth Opening: 3 Fingerbreadth


Neck Mobility: Normal


Mallampati classification: I


Thyromental Distance: 4-6 cm


Respiratory: Lungs clear


Cardiovascular: Regular rate





Plan


Anesthesia Type: MAC


Consent for Procedure(s) Verified and Reviewed: Yes


Code Status: Attempt Resuscitation


ASA classification: 3-Severe systemic disease


Is this case an emergency?: No

## 2023-02-02 NOTE — ANESTHESIA POST OP EVALUATION
Anesthesia Post Eval





- Post Anesthesia Eval


Vitals: 





                                Last Vital Signs











Temp  36.7 C   02/02/23 10:01


 


Pulse  63   02/02/23 10:15


 


Resp  19   02/02/23 10:15


 


BP  114/74   02/02/23 10:15


 


Pulse Ox  100   02/02/23 10:15


 


O2 Flow Rate      











CV Function Including HR & BP: Stable


Pain Control: Satisfactory


Nausea & Vomiting: Negative


Mental Status: Baseline


Respiratory Status: Airway Patent


Hydration Status: Satisfactory


Anesthesia Complications: None

## 2023-02-20 ENCOUNTER — HOSPITAL ENCOUNTER (OUTPATIENT)
Dept: HOSPITAL 76 - EMS | Age: 78
Discharge: ANOTHER HOSPITAL | End: 2023-02-20
Payer: OTHER GOVERNMENT

## 2023-02-20 DIAGNOSIS — I21.09: Primary | ICD-10-CM

## 2024-01-23 ENCOUNTER — HOSPITAL ENCOUNTER (OUTPATIENT)
Dept: HOSPITAL 76 - DI | Age: 79
Discharge: HOME | End: 2024-01-23
Payer: OTHER GOVERNMENT

## 2024-01-23 DIAGNOSIS — I35.0: Primary | ICD-10-CM

## 2024-01-23 DIAGNOSIS — I51.7: ICD-10-CM

## 2024-01-23 DIAGNOSIS — I35.1: ICD-10-CM

## 2024-01-23 DIAGNOSIS — I77.810: ICD-10-CM

## 2024-01-23 PROCEDURE — 93307 TTE W/O DOPPLER COMPLETE: CPT

## 2024-02-15 ENCOUNTER — HOSPITAL ENCOUNTER (OUTPATIENT)
Dept: HOSPITAL 76 - ED | Age: 79
Setting detail: OBSERVATION
LOS: 2 days | Discharge: HOME | End: 2024-02-17
Attending: SPECIALIST | Admitting: SPECIALIST
Payer: OTHER GOVERNMENT

## 2024-02-15 DIAGNOSIS — E78.00: ICD-10-CM

## 2024-02-15 DIAGNOSIS — D50.9: Primary | ICD-10-CM

## 2024-02-15 DIAGNOSIS — F32.A: ICD-10-CM

## 2024-02-15 DIAGNOSIS — Z79.02: ICD-10-CM

## 2024-02-15 DIAGNOSIS — K29.50: ICD-10-CM

## 2024-02-15 DIAGNOSIS — R19.5: ICD-10-CM

## 2024-02-15 DIAGNOSIS — K63.5: ICD-10-CM

## 2024-02-15 DIAGNOSIS — K21.9: ICD-10-CM

## 2024-02-15 DIAGNOSIS — G40.909: ICD-10-CM

## 2024-02-15 DIAGNOSIS — K44.9: ICD-10-CM

## 2024-02-15 DIAGNOSIS — K31.7: ICD-10-CM

## 2024-02-15 DIAGNOSIS — D12.0: ICD-10-CM

## 2024-02-15 DIAGNOSIS — F41.9: ICD-10-CM

## 2024-02-15 DIAGNOSIS — I25.2: ICD-10-CM

## 2024-02-15 DIAGNOSIS — F17.290: ICD-10-CM

## 2024-02-15 DIAGNOSIS — I10: ICD-10-CM

## 2024-02-15 DIAGNOSIS — Z86.711: ICD-10-CM

## 2024-02-15 DIAGNOSIS — I48.91: ICD-10-CM

## 2024-02-15 LAB
ALBUMIN DIAFP-MCNC: 4 G/DL (ref 3.2–5.5)
ALBUMIN/GLOB SERPL: 1.3 {RATIO} (ref 1–2.2)
ALP SERPL-CCNC: 74 IU/L (ref 42–121)
ALT SERPL W P-5'-P-CCNC: 9 IU/L (ref 10–60)
ANION GAP SERPL CALCULATED.4IONS-SCNC: 5 MMOL/L (ref 6–13)
AST SERPL W P-5'-P-CCNC: 19 IU/L (ref 10–42)
BASOPHILS NFR BLD AUTO: 0 10^3/UL (ref 0–0.1)
BASOPHILS NFR BLD AUTO: 0.6 %
BILIRUB BLD-MCNC: 0.4 MG/DL (ref 0.2–1)
BUN SERPL-MCNC: 26 MG/DL (ref 6–20)
CALCIUM UR-MCNC: 9.1 MG/DL (ref 8.5–10.3)
CHLORIDE SERPL-SCNC: 102 MMOL/L (ref 101–111)
CO2 SERPL-SCNC: 29 MMOL/L (ref 21–32)
CREAT SERPLBLD-SCNC: 1.1 MG/DL (ref 0.6–1.3)
EOSINOPHIL # BLD AUTO: 0.4 10^3/UL (ref 0–0.7)
EOSINOPHIL NFR BLD AUTO: 5.7 %
ERYTHROCYTE [DISTWIDTH] IN BLOOD BY AUTOMATED COUNT: 16.6 % (ref 12–15)
ERYTHROCYTE [DISTWIDTH] IN BLOOD BY AUTOMATED COUNT: 17.3 % (ref 12–15)
GFRSERPLBLD MDRD-ARVRAT: 65 ML/MIN/{1.73_M2} (ref 89–?)
GLOBULIN SER-MCNC: 3 G/DL (ref 2.1–4.2)
GLUCOSE SERPL-MCNC: 101 MG/DL (ref 74–104)
H PYLORI AG STL QL IA.RAPID: NEGATIVE
HCT VFR BLD AUTO: 23.7 % (ref 42–52)
HCT VFR BLD AUTO: 26.9 % (ref 42–52)
HGB UR QL STRIP: 6.7 G/DL (ref 14–18)
HGB UR QL STRIP: 7.8 G/DL (ref 14–18)
IRON SATN MFR SERPL: (no result) % (ref 20–50)
IRON SERPL-MCNC: < 10 UG/DL (ref 50–212)
LIPASE SERPL-CCNC: 24 U/L (ref 11–82)
LYMPHOCYTES # SPEC AUTO: 0.6 10^3/UL (ref 1.5–3.5)
LYMPHOCYTES NFR BLD AUTO: 8.9 %
MAGNESIUM SERPL-MCNC: 2 MG/DL (ref 1.7–2.3)
MCH RBC QN AUTO: 21.1 PG (ref 27–31)
MCH RBC QN AUTO: 22.4 PG (ref 27–31)
MCHC RBC AUTO-ENTMCNC: 28.3 G/DL (ref 32–36)
MCHC RBC AUTO-ENTMCNC: 29 G/DL (ref 32–36)
MCV RBC AUTO: 74.8 FL (ref 80–94)
MCV RBC AUTO: 77.3 FL (ref 80–94)
MONOCYTES # BLD AUTO: 0.7 10^3/UL (ref 0–1)
MONOCYTES NFR BLD AUTO: 9.6 %
NEUTROPHILS # BLD AUTO: 5.4 10^3/UL (ref 1.5–6.6)
NEUTROPHILS # SNV AUTO: 6.7 X10^3/UL (ref 4.8–10.8)
NEUTROPHILS # SNV AUTO: 7.2 X10^3/UL (ref 4.8–10.8)
NEUTROPHILS NFR BLD AUTO: 74.8 %
NRBC # BLD AUTO: 0 /100WBC
NRBC # BLD AUTO: 0 X10^3/UL
PDW BLD AUTO: 10.4 FL (ref 7.4–11.4)
PDW BLD AUTO: 10.7 FL (ref 7.4–11.4)
PLATELET # BLD: 222 10^3/UL (ref 130–450)
PLATELET # BLD: 234 10^3/UL (ref 130–450)
POTASSIUM SERPL-SCNC: 4.3 MMOL/L (ref 3.5–4.5)
PROT SPEC-MCNC: 7 G/DL (ref 6.4–8.9)
RBC MAR: 3.17 10^6/UL (ref 4.7–6.1)
RBC MAR: 3.48 10^6/UL (ref 4.7–6.1)
SODIUM SERPLBLD-SCNC: 136 MMOL/L (ref 135–145)
TIBC SERPL-MCNC: 410 UG/DL (ref 250–450)
TRANSFERRIN SERPL-MCNC: 293 MG/DL (ref 203–362)

## 2024-02-15 PROCEDURE — 86920 COMPATIBILITY TEST SPIN: CPT

## 2024-02-15 PROCEDURE — 86901 BLOOD TYPING SEROLOGIC RH(D): CPT

## 2024-02-15 PROCEDURE — 45388 COLONOSCOPY W/ABLATION: CPT

## 2024-02-15 PROCEDURE — 74177 CT ABD & PELVIS W/CONTRAST: CPT

## 2024-02-15 PROCEDURE — 86850 RBC ANTIBODY SCREEN: CPT

## 2024-02-15 PROCEDURE — 85027 COMPLETE CBC AUTOMATED: CPT

## 2024-02-15 PROCEDURE — 83690 ASSAY OF LIPASE: CPT

## 2024-02-15 PROCEDURE — 83540 ASSAY OF IRON: CPT

## 2024-02-15 PROCEDURE — 96375 TX/PRO/DX INJ NEW DRUG ADDON: CPT

## 2024-02-15 PROCEDURE — 96366 THER/PROPH/DIAG IV INF ADDON: CPT

## 2024-02-15 PROCEDURE — 86900 BLOOD TYPING SEROLOGIC ABO: CPT

## 2024-02-15 PROCEDURE — 80053 COMPREHEN METABOLIC PANEL: CPT

## 2024-02-15 PROCEDURE — 85014 HEMATOCRIT: CPT

## 2024-02-15 PROCEDURE — 82746 ASSAY OF FOLIC ACID SERUM: CPT

## 2024-02-15 PROCEDURE — 99285 EMERGENCY DEPT VISIT HI MDM: CPT

## 2024-02-15 PROCEDURE — 99284 EMERGENCY DEPT VISIT MOD MDM: CPT

## 2024-02-15 PROCEDURE — 84466 ASSAY OF TRANSFERRIN: CPT

## 2024-02-15 PROCEDURE — 82607 VITAMIN B-12: CPT

## 2024-02-15 PROCEDURE — 96376 TX/PRO/DX INJ SAME DRUG ADON: CPT

## 2024-02-15 PROCEDURE — 85018 HEMOGLOBIN: CPT

## 2024-02-15 PROCEDURE — 96372 THER/PROPH/DIAG INJ SC/IM: CPT

## 2024-02-15 PROCEDURE — 36430 TRANSFUSION BLD/BLD COMPNT: CPT

## 2024-02-15 PROCEDURE — 93005 ELECTROCARDIOGRAM TRACING: CPT

## 2024-02-15 PROCEDURE — 87338 HPYLORI STOOL AG IA: CPT

## 2024-02-15 PROCEDURE — 82272 OCCULT BLD FECES 1-3 TESTS: CPT

## 2024-02-15 PROCEDURE — 83735 ASSAY OF MAGNESIUM: CPT

## 2024-02-15 PROCEDURE — 36415 COLL VENOUS BLD VENIPUNCTURE: CPT

## 2024-02-15 PROCEDURE — 82378 CARCINOEMBRYONIC ANTIGEN: CPT

## 2024-02-15 PROCEDURE — 96365 THER/PROPH/DIAG IV INF INIT: CPT

## 2024-02-15 PROCEDURE — 43270 EGD LESION ABLATION: CPT

## 2024-02-15 PROCEDURE — 85025 COMPLETE CBC W/AUTO DIFF WBC: CPT

## 2024-02-15 RX ADMIN — SODIUM CHLORIDE, PRESERVATIVE FREE PRN ML: 5 INJECTION INTRAVENOUS at 14:53

## 2024-02-15 RX ADMIN — SODIUM CHLORIDE, PRESERVATIVE FREE SCH ML: 5 INJECTION INTRAVENOUS at 15:55

## 2024-02-15 RX ADMIN — SODIUM CHLORIDE SCH MG: 9 INJECTION, SOLUTION INTRAVENOUS at 14:53

## 2024-02-15 RX ADMIN — PANTOPRAZOLE SODIUM SCH: 40 TABLET, DELAYED RELEASE ORAL at 22:55

## 2024-02-15 NOTE — CT REPORT
PROCEDURE:  Abdomen/Pelvis W

 

INDICATIONS:  iron def anemia

 

CONTRAST:  Omni 300 

 

TECHNIQUE: 

After the administration of intravenous contrast, a CT scan of the abdomen and pelvis was performed. 
Images were recorded and evaluated at appropriate window settings. Reformats: coronal and sagittal. F
or radiation dose reduction, the following was used: automated exposure control, adjustment of mA and
/or kV according to patient size.

 

COMPARISON:  CT chest abdomen pelvis were 2120

 

FINDINGS:  

Image quality: Diagnostic.

 

Lower chest: Unremarkable. 

 

Liver: No solid mass.

Gallbladder and biliary tree: No radiopaque stones or wall thickening. No biliary dilation.

Spleen: No splenomegaly.

Pancreas: No pancreatic ductal dilation.

Adrenals: No adrenal nodule.

Kidneys and ureters: No hydronephrosis. No renal cystic lesion which requires follow up. No solid mas
s. Renal simple appearing cysts.

 

Stomach, bowel and peritoneum: No bowel distension. No pathologic free fluid. Stable ovoid rim calcif
ied lesion in the anterior-inferior left peritoneal cavity. Scattered diverticulosis without evidence
 of acute diverticulitis. Normal appendix. 

Lymph nodes: No central or retroperitoneal adenopathy.

Vessels: No infrarenal aortic aneurysm. Atherosclerotic vascular calcifications.

 

PELVIS

Reproductive organs: Unremarkable.

Bladder: No abnormal wall thickening, accounting for underdistention.

Pelvic lymph nodes: No pelvic adenopathy by size criteria.

 

Bones: No aggressive osseous abnormality. Degenerative changes of the spine. Decreased osseous minera
lization.

Other: Small bilateral fat-containing inguinal hernias.

 

 

IMPRESSION:  

1.No significant abnormality is seen within the abdomen or pelvis.

2.Stable chronic findings as described above.

 

 

 

Reviewed by: Galen Duggan MD on 2/15/2024 5:09 PM PST

Approved by: Galen Duggan MD on 2/15/2024 5:09 PM PST

 

 

Station ID:  [id number]

## 2024-02-15 NOTE — ED PHYSICIAN DOCUMENTATION
PD HPI DYSPNEA





- Stated complaint


Stated Complaint: ANEMIC/TRANSFUSION





- Chief complaint


Chief Complaint: Abd Pain





- History obtained from


History obtained from: Patient





- History of Present Illness


Timing - onset: How many weeks ago (2-3 weeks of dyspnea on exertion.)





PD PAST MEDICAL HISTORY





- Past Medical History


Cardiovascular: Hypertension, High cholesterol, MI, Other


Respiratory: None


Neuro: Seizure disorder


Endocrine/Autoimmune: None


GI: GERD, Hiatal hernia


: None


HEENT: None


Psych: Depression, Anxiety, Panic attacks, Post traumatic stress disorder


Musculoskeletal: None


Derm: None





- Past Surgical History


Past Surgical History: Yes


General: Other


Cardiovascular: Coronary stent





- Present Medications


Home Medications: 


                                Ambulatory Orders











 Medication  Instructions  Recorded  Confirmed


 


Atorvastatin Calcium 80 mg PO DAILY 03/04/16 02/15/24


 


Levetiracetam [Keppra] 750 mg PO BID 03/04/16 02/15/24


 


Pantoprazole [Protonix] 20 mg PO DAILY 02/22/22 02/15/24


 


DULoxetine [Cymbalta] 60 mg PO DAILY 10/09/22 02/15/24


 


QUEtiapine [SEROquel] 50 mg PO QPM 10/09/22 02/15/24


 


Baclofen 10 mg PO TID PRN 02/15/24 02/15/24


 


Clopidogrel [Plavix] 75 mg PO DAILY 02/15/24 02/15/24


 


Lisinopril [Zestril] 5 mg PO DAILY 02/15/24 02/15/24


 


Metoprolol Succinate [Toprol Xl] 25 mg PO DAILY 02/15/24 02/15/24


 


QUEtiapine [SEROquel] 25 mg PO DAILY 02/15/24 02/15/24


 


Sertraline [Zoloft] 100 mg PO DAILY 02/15/24 02/15/24


 


Tamsulosin [Flomax] 0.4 mg PO DAILY 02/15/24 02/15/24


 


busPIRone [Buspar] 5 mg PO BID 02/15/24 02/15/24














- Allergies


Allergies/Adverse Reactions: 


                                    Allergies











Allergy/AdvReac Type Severity Reaction Status Date / Time


 


fluoxetine AdvReac Unknown Anxiety Verified 02/15/24 09:59


 


oxycodone HCl * AdvReac Unknown Hallucinati Verified 02/15/24 09:59





[From OxyContin]   ons  














- Social History


Does the pt smoke?: No


Smoking Status: Never smoker


Does the pt drink ETOH?: No


Does the pt have substance abuse?: Yes





- Immunizations


Immunizations are current?: Yes





- POLST


Patient has POLST: No





Results





- Vitals


Vitals: 


                               Vital Signs - 24 hr











  02/15/24 02/15/24 02/15/24





  09:07 09:43 10:44


 


Temperature 37.1 C  


 


Heart Rate 68 63 61


 


Respiratory 16 18 16





Rate   


 


Blood Pressure 93/53 L 98/67 92/55 L


 


O2 Saturation 100 100 100








                                     Oxygen











O2 Source                      Room air

















- Labs


Labs: 


                                Laboratory Tests











  02/15/24 02/15/24 02/15/24





  09:56 09:56 09:56


 


WBC   7.2 


 


RBC   3.17 L 


 


Hgb   6.7 L* 


 


Hct   23.7 L 


 


MCV   74.8 L 


 


MCH   21.1 L 


 


MCHC   28.3 L 


 


RDW   16.6 H 


 


Plt Count   234 


 


MPV   10.7 


 


Neut # (Auto)   5.4 


 


Lymph # (Auto)   0.6 L 


 


Mono # (Auto)   0.7 


 


Eos # (Auto)   0.4 


 


Baso # (Auto)   0.0 


 


Absolute Nucleated RBC   0.00 


 


Nucleated RBC %   0.0 


 


Sodium    136


 


Potassium    4.3


 


Chloride    102


 


Carbon Dioxide    29


 


Anion Gap    5.0 L


 


BUN    26 H


 


Creatinine    1.1


 


Estimated GFR (MDRD)    65 L


 


Glucose    101


 


Calcium    9.1


 


Magnesium    2.0


 


Iron    < 10 L


 


TIBC    410


 


% Saturation    TNP


 


Transferrin    293


 


Total Bilirubin    0.4


 


AST    19


 


ALT    9 L


 


Alkaline Phosphatase    74


 


Total Protein    7.0


 


Albumin    4.0


 


Globulin    3.0


 


Albumin/Globulin Ratio    1.3


 


Lipase    24


 


Vitamin B12    288


 


Folate    16.9


 


Blood Type  O POSITIVE  


 


Blood Type Recheck   


 


Antibody Screen  NEGATIVE  


 


Crossmatch IS Only  See Detail  














  02/15/24





  10:03


 


WBC 


 


RBC 


 


Hgb 


 


Hct 


 


MCV 


 


MCH 


 


MCHC 


 


RDW 


 


Plt Count 


 


MPV 


 


Neut # (Auto) 


 


Lymph # (Auto) 


 


Mono # (Auto) 


 


Eos # (Auto) 


 


Baso # (Auto) 


 


Absolute Nucleated RBC 


 


Nucleated RBC % 


 


Sodium 


 


Potassium 


 


Chloride 


 


Carbon Dioxide 


 


Anion Gap 


 


BUN 


 


Creatinine 


 


Estimated GFR (MDRD) 


 


Glucose 


 


Calcium 


 


Magnesium 


 


Iron 


 


TIBC 


 


% Saturation 


 


Transferrin 


 


Total Bilirubin 


 


AST 


 


ALT 


 


Alkaline Phosphatase 


 


Total Protein 


 


Albumin 


 


Globulin 


 


Albumin/Globulin Ratio 


 


Lipase 


 


Vitamin B12 


 


Folate 


 


Blood Type 


 


Blood Type Recheck  O POSITIVE


 


Antibody Screen 


 


Crossmatch IS Only 














Departure





- Departure


Disposition: ED Place in Observation


Clinical Impression: 


 Anemia, Dyspnea on exertion





Condition: Stable


Record reviewed to determine appropriate education?: Yes


Forms:  PCP List

## 2024-02-15 NOTE — PHARMACY PROGRESS NOTE
- Best Possible Medication History


Admit Date and Time: 02/15/24 1333


Processed by: Pharmacy


Medications reviewed in ED?: Yes


Medication History completed: Yes


Patient Interview: Completed


Secondary Source(s): Physician records, Insurance records





As the person ultimately responsible for medication therapy, providers are able 

to order a medication from an existing home medication list in [facility] via the 

"Reconcile Routine" prior to Confirmation of that medication by support staff. 

Such practice is discouraged except when the physician, in their clinical 

judgment, deems that a medical need exists for a medication without regard to 

previous use.

## 2024-02-15 NOTE — HISTORY & PHYSICAL EXAMINATION
Chief Complaint





- Chief Complaint


Chief Complaint: HARE





History of Present Illness





- Admitted From


Admitted From:: ER





- History Obtained From


Records Reviewed: From ER


History obtained from: Patient, ER records


Exam Limitations: None





- History of Present Illness


HPI Comment/Other: 





78 YOM with a Hx of MI, HTN, HDL, anxiety and depression presented to the ER for

HARE which has been going on for two weeks now, feels short of breath just 

walking to the fridge and back which is not normal for him. Takes him about 5-10

min of rest after he walks to feel back to normal. Denies any associated chest 

pain, he does states he gets a "deep feeling" in his chest but denies 

discomfort. Pt was seen at PCP's office yesterday and was found to have a HgB of

6.4, told to go to the ER which requested admission after a repeat HgB of 6.7 

and Iron of < 10. Pt denies any chest, shoulder or jaw pain associated with his 

HARE. No syncope, light headedness, dizziness, fever, chills, sweats, N/V/D, abd 

pain. Last BM yesterday, no dark stools or hematochezia. He reports Hx of 

nicotine vaping, goes through one vape pen every couple of weeks. No alcohol. 





History





- Past Medical History


Cardiovascular: reports: Hypertension, High cholesterol, MI, Other


Respiratory: reports: None


Neuro: reports: Seizure disorder


Endocrine/Autoimmune: reports: None


GI: reports: GERD, Hiatal hernia


: reports: None


HEENT: reports: None


Psych: reports: Depression, Anxiety, Panic attacks, Post traumatic stress disord

er


Musculoskeletal: reports: None


Derm: reports: None


MRSA Hx?: No





- Past Surgical History


General: reports: Other


Cardiovascular: reports: Coronary stent





- Family & Social History


Family History: Mother: , Father: 


Family History Comment/Other: Denies any medical Hx with his father, mother or 

siblings.


Living arrangement: At home


Living Situation: With spouse/s.o.





- Substance History


Use: Uses substance without health or social issues: Tobacco


Abuse: Recurrent use of substance despite neg consequences: NONE


Dependence: Experiences withdrawal or developed tolerances: NONE


Tobacco Details: E-Cigarettes





- POLST


Patient has POLST: No


POLST Status: Full Code





Meds/Allgy





- Home Medications


Home Medications: 


                                Ambulatory Orders











 Medication  Instructions  Recorded  Confirmed


 


Atorvastatin Calcium 80 mg PO DAILY 03/04/16 02/15/24


 


Levetiracetam [Keppra] 750 mg PO BID 03/04/16 02/15/24


 


Pantoprazole [Protonix] 20 mg PO BID 02/22/22 02/15/24


 


DULoxetine [Cymbalta] 60 mg PO BID 10/09/22 02/15/24


 


QUEtiapine [SEROquel] 100 mg PO QPM 10/09/22 02/15/24


 


Apixaban [Eliquis] 5 mg PO BID 02/15/24 02/15/24


 


Baclofen 10 mg PO TID PRN 02/15/24 02/15/24


 


Clopidogrel [Plavix] 75 mg PO DAILY 02/15/24 02/15/24


 


Lisinopril [Zestril] 5 mg PO DAILY 02/15/24 02/15/24


 


Metoprolol Succinate [Toprol Xl] 25 mg PO DAILY 02/15/24 02/15/24


 


QUEtiapine [SEROquel] 12.5 mg PO DAILY 02/15/24 02/15/24


 


Sertraline [Zoloft] 200 mg PO DAILY 02/15/24 02/15/24


 


Tamsulosin [Flomax] 0.4 mg PO DAILY 02/15/24 02/15/24


 


busPIRone [Buspar] 5 mg PO BID 02/15/24 02/15/24














- Allergies


Allergies/Adverse Reactions: 


                                    Allergies











Allergy/AdvReac Type Severity Reaction Status Date / Time


 


fluoxetine AdvReac Unknown Anxiety Verified 02/15/24 09:59


 


oxycodone HCl * AdvReac Unknown Hallucinati Verified 02/15/24 09:59





[From OxyContin]   ons  














Review of Systems





- Constitutional


Constitutional: denies: Fever, Chills





- Cardiovascular


Cariovascular: reports: Exertional dyspnea.  denies: Chest pain, 

Lightheadedness, Syncope





- Respiratory


Respiratory: reports: SOB with exertion.  denies: Cough





- Gastrointestinal


Gastrointestinal: reports: Constipation.  denies: Abdominal pain, Diarrhea, 

Nausea, Vomiting





- Genitourinary


Genitourinary: denies: Dysuria





- Musculoskeletal


Musculoskeletal: denies: Back pain, Muscle aches





- Integumentary


Integumentary: denies: Rash





- Neurological


Neurological: denies: Dizziness, Numbness





- Psychiatric


Psychiatric: reports: Depression, Anxiety





- Endocrine


Endocrine: denies: Polyuria





- Hematologic/Lymphatic


Hematologic/Lymphatic: reports: Anemia





Exam





- Vital Signs


Vital Signs: 





                                Vital Signs x48h











  Temp Pulse Pulse Resp BP BP Pulse Ox


 


 02/15/24 15:04  36.9 C   60  16   104/62  98


 


 02/15/24 14:32  36.9 C   61  16   106/60  98


 


 02/15/24 13:39   63   19  89/70 L   100


 


 02/15/24 12:47   66   23  99/52 L   100


 


 02/15/24 12:25  36.7 C   61  21   87/61 L  100


 


 02/15/24 12:05  37.2 C   59 L  23   97/61  99


 


 02/15/24 10:44   61   16  92/55 L   100


 


 02/15/24 09:43   63   18  98/67   100


 


 02/15/24 09:07  37.1 C  68   16  93/53 L   100














- Physical Exam


General Appearance: positive: No acute distress, Alert


Eyes Bilateral: positive: PERRL, EOMI


ENT: positive: ENT inspection nml


Respiratory: positive: Chest non-tender, No respiratory distress, Breath sounds 

nml


Cardiovascular: positive: Regular rate & rhythm, Systolic murmur


Peripheral Pulses: positive: 2+


Abdomen: positive: Non-tender


Skin: positive: Color nml, Warm, Dry


Neurologic/Psychiatric: positive: Oriented x3





Conclusion/Plan





- Problem List


(1) Anemia


Conclusion/Plan: 


2 weeks of HARE, blood work at PCP's office showed HgB of 6.4. Labs here showed 

HgB of 6.7, HCT 23.7, MCV 74.8, RDW 16.6, MCHC 28.3. Pt's stool positive for 

occult blood and (-) for H.Pylori. 1 unit of RBC's has been administered in ICU 

w/o complications. CT of abd/pelvis showed no significant findings. Vitals 

stable. Pt feeling well in room. No light headedness, dizziness, nausea, CP or 

SOB. States he has an appetite. 





Plan:


-Admit and monitor over night, discharge if stable in the morning. 


-Monitor CBC series. 











(2) Dyspnea on exertion


Conclusion/Plan: 


2 weeks of HARE at home, unable to walk short distances which is not normal for 

the patient. vitals here at 15:32: 36.7C, HR 72, /60, RR 18, 98% RA. As 

above 1 unit of RBC's administered for iron deficient anemia. Pt able to walk to

bathroom w/o walker or assistance, no CP or SOB while doing so. Will monitor 

overnight. 








- Lab Results


Fish Bones: 


                                 02/15/24 17:40





                                 02/15/24 09:56

## 2024-02-16 LAB
BASOPHILS NFR BLD AUTO: 0 10^3/UL (ref 0–0.1)
BASOPHILS NFR BLD AUTO: 0.4 %
EOSINOPHIL # BLD AUTO: 0.5 10^3/UL (ref 0–0.7)
EOSINOPHIL NFR BLD AUTO: 7.1 %
ERYTHROCYTE [DISTWIDTH] IN BLOOD BY AUTOMATED COUNT: 17.4 % (ref 12–15)
HCT VFR BLD AUTO: 23.5 % (ref 42–52)
HCT VFR BLD AUTO: 28.3 % (ref 42–52)
HGB UR QL STRIP: 7 G/DL (ref 14–18)
HGB UR QL STRIP: 8.4 G/DL (ref 14–18)
LYMPHOCYTES # SPEC AUTO: 0.9 10^3/UL (ref 1.5–3.5)
LYMPHOCYTES NFR BLD AUTO: 12 %
MCH RBC QN AUTO: 22.7 PG (ref 27–31)
MCHC RBC AUTO-ENTMCNC: 29.8 G/DL (ref 32–36)
MCV RBC AUTO: 76.3 FL (ref 80–94)
MONOCYTES # BLD AUTO: 0.9 10^3/UL (ref 0–1)
MONOCYTES NFR BLD AUTO: 11.9 %
NEUTROPHILS # BLD AUTO: 5.2 10^3/UL (ref 1.5–6.6)
NEUTROPHILS # SNV AUTO: 7.6 X10^3/UL (ref 4.8–10.8)
NEUTROPHILS NFR BLD AUTO: 68.2 %
NRBC # BLD AUTO: 0 /100WBC
NRBC # BLD AUTO: 0 X10^3/UL
PDW BLD AUTO: 11 FL (ref 7.4–11.4)
PLATELET # BLD: 197 10^3/UL (ref 130–450)
RBC MAR: 3.08 10^6/UL (ref 4.7–6.1)

## 2024-02-16 RX ADMIN — BISACODYL SCH: 10 SUPPOSITORY RECTAL at 19:30

## 2024-02-16 RX ADMIN — SERTRALINE HYDROCHLORIDE SCH MG: 50 TABLET ORAL at 08:23

## 2024-02-16 RX ADMIN — CYANOCOBALAMIN SCH MCG: 1000 INJECTION, SOLUTION INTRAMUSCULAR at 13:55

## 2024-02-16 RX ADMIN — PANTOPRAZOLE SODIUM SCH MG: 40 TABLET, DELAYED RELEASE ORAL at 08:22

## 2024-02-16 RX ADMIN — SODIUM FERRIC GLUCONATE COMPLEX IN SUCROSE SCH MLS/HR: 12.5 INJECTION INTRAVENOUS at 13:55

## 2024-02-16 RX ADMIN — POLYETHYLENE GLYCOL-3350 AND ELECTROLYTES SCH L: 236; 6.74; 5.86; 2.97; 22.74 POWDER, FOR SOLUTION ORAL at 15:41

## 2024-02-16 RX ADMIN — DOCUSATE SODIUM SCH MG: 250 CAPSULE, LIQUID FILLED ORAL at 10:42

## 2024-02-16 RX ADMIN — SODIUM CHLORIDE SCH MG: 9 INJECTION, SOLUTION INTRAVENOUS at 15:53

## 2024-02-16 RX ADMIN — TAMSULOSIN HYDROCHLORIDE SCH MG: 0.4 CAPSULE ORAL at 08:22

## 2024-02-16 RX ADMIN — ATORVASTATIN CALCIUM SCH MG: 40 TABLET, FILM COATED ORAL at 08:21

## 2024-02-16 RX ADMIN — METOPROLOL SUCCINATE SCH MG: 25 TABLET, EXTENDED RELEASE ORAL at 08:22

## 2024-02-16 NOTE — PROVIDER PROGRESS NOTE
Subjective





- Prog Note Date


Prog Note Date: 02/16/24


Prog Note Time: 10:00





- Subjective


Pt reports feeling: No change


Subjective: 


Pt doing well. Eating breakfast comfortably. Per nurse able to walk to the 

bathroom w/o assistance this morning, denies any pain, SOB, light headedness or 

dizziness.  








Objective





- Vital Signs/Intake & Output


Vital Signs: 


                                Vital Signs x48h











  Temp Pulse Resp BP BP Pulse Ox


 


 02/16/24 05:30  37.1 C  62  18  104/62   97


 


 02/15/24 23:41  37.1 C  66  18   115/67  98











Intake & Output: 


                                 Intake & Output











 02/13/24 02/14/24 02/15/24 02/16/24





 23:59 23:59 23:59 23:59


 


Intake Total   790 


 


Output Total   400 


 


Balance   390 














- Objective


General Appearance: positive: No acute distress, Alert


Eyes Bilateral: positive: EOMI


ENT: positive: ENT inspection nml


Neck: positive: Nml inspection, No JVD


Respiratory: positive: Chest non-tender, No respiratory distress, Breath sounds 

nml


Cardiovascular: positive: Regular rate & rhythm, Systolic murmur


Peripheral Pulses: 2+ Radial (R)


Skin: positive: Color nml, Warm, Dry.  negative: Cyanosis


Extremities: positive: Non-tender


Neurologic/Psychiatric: positive: Oriented x3, Mood/affect nml





- Lab Results


Fish Bones: 


                                 02/16/24 05:22





                                 02/15/24 09:56


Other Labs: 


                               Lab Results x24hrs











  02/16/24 02/15/24 02/15/24 Range/Units





  05:22 17:40 17:40 


 


WBC  7.6   6.7  (4.8-10.8)  x10^3/uL


 


RBC  3.08 L   3.48 L  (4.70-6.10)  10^6/uL


 


Hgb  7.0 L*   7.8 L  (14.0-18.0)  g/dL


 


Hct  23.5 L   26.9 L  (42.0-52.0)  %


 


MCV  76.3 L   77.3 L  (80.0-94.0)  fL


 


MCH  22.7 L   22.4 L  (27.0-31.0)  pg


 


MCHC  29.8 L   29.0 L  (32.0-36.0)  g/dL


 


RDW  17.4 H   17.3 H  (12.0-15.0)  %


 


Plt Count  197   222  (130-450)  10^3/uL


 


MPV  11.0   10.4  (7.4-11.4)  fL


 


Neut # (Auto)  5.2    (1.5-6.6)  10^3/uL


 


Lymph # (Auto)  0.9 L    (1.5-3.5)  10^3/uL


 


Mono # (Auto)  0.9    (0.0-1.0)  10^3/uL


 


Eos # (Auto)  0.5    (0.0-0.7)  10^3/uL


 


Baso # (Auto)  0.0    (0.0-0.1)  10^3/uL


 


Absolute Nucleated RBC  0.00    x10^3/uL


 


Nucleated RBC %  0.0    /100WBC


 


Sodium     (135-145)  mmol/L


 


Potassium     (3.5-4.5)  mmol/L


 


Chloride     (101-111)  mmol/L


 


Carbon Dioxide     (21-32)  mmol/L


 


Anion Gap     (6-13)  


 


BUN     (6-20)  mg/dL


 


Creatinine     (0.6-1.3)  mg/dL


 


Estimated GFR (MDRD)     (>89)  


 


Glucose     ()  mg/dL


 


Calcium     (8.5-10.3)  mg/dL


 


Magnesium     (1.7-2.3)  mg/dL


 


Iron     ()  ug/dL


 


TIBC     (250-450)  ug/dL


 


% Saturation     


 


Transferrin     (203-362)  mg/dL


 


Total Bilirubin     (0.2-1.0)  mg/dL


 


AST     (10-42)  IU/L


 


ALT     (10-60)  IU/L


 


Alkaline Phosphatase     ()  IU/L


 


Total Protein     (6.4-8.9)  g/dL


 


Albumin     (3.2-5.5)  g/dL


 


Globulin     (2.1-4.2)  g/dL


 


Albumin/Globulin Ratio     (1.0-2.2)  


 


Lipase     (11-82)  U/L


 


Carcinoembryonic Ag   2.9   ng/mL


 


Vitamin B12     (180-914)  pg/mL


 


Folate     (5.90 - >24.8)  ng/mL


 


Stool H. pylori Ag     (Negative)  


 


Blood Type     


 


Blood Type Recheck     


 


Antibody Screen     


 


Crossmatch IS Only     














  02/15/24 02/15/24 02/15/24 Range/Units





  12:39 10:03 09:56 


 


WBC     (4.8-10.8)  x10^3/uL


 


RBC     (4.70-6.10)  10^6/uL


 


Hgb     (14.0-18.0)  g/dL


 


Hct     (42.0-52.0)  %


 


MCV     (80.0-94.0)  fL


 


MCH     (27.0-31.0)  pg


 


MCHC     (32.0-36.0)  g/dL


 


RDW     (12.0-15.0)  %


 


Plt Count     (130-450)  10^3/uL


 


MPV     (7.4-11.4)  fL


 


Neut # (Auto)     (1.5-6.6)  10^3/uL


 


Lymph # (Auto)     (1.5-3.5)  10^3/uL


 


Mono # (Auto)     (0.0-1.0)  10^3/uL


 


Eos # (Auto)     (0.0-0.7)  10^3/uL


 


Baso # (Auto)     (0.0-0.1)  10^3/uL


 


Absolute Nucleated RBC     x10^3/uL


 


Nucleated RBC %     /100WBC


 


Sodium    136  (135-145)  mmol/L


 


Potassium    4.3  (3.5-4.5)  mmol/L


 


Chloride    102  (101-111)  mmol/L


 


Carbon Dioxide    29  (21-32)  mmol/L


 


Anion Gap    5.0 L  (6-13)  


 


BUN    26 H  (6-20)  mg/dL


 


Creatinine    1.1  (0.6-1.3)  mg/dL


 


Estimated GFR (MDRD)    65 L  (>89)  


 


Glucose    101  ()  mg/dL


 


Calcium    9.1  (8.5-10.3)  mg/dL


 


Magnesium    2.0  (1.7-2.3)  mg/dL


 


Iron    < 10 L  ()  ug/dL


 


TIBC    410  (250-450)  ug/dL


 


% Saturation    TNP  


 


Transferrin    293  (203-362)  mg/dL


 


Total Bilirubin    0.4  (0.2-1.0)  mg/dL


 


AST    19  (10-42)  IU/L


 


ALT    9 L  (10-60)  IU/L


 


Alkaline Phosphatase    74  ()  IU/L


 


Total Protein    7.0  (6.4-8.9)  g/dL


 


Albumin    4.0  (3.2-5.5)  g/dL


 


Globulin    3.0  (2.1-4.2)  g/dL


 


Albumin/Globulin Ratio    1.3  (1.0-2.2)  


 


Lipase    24  (11-82)  U/L


 


Carcinoembryonic Ag     ng/mL


 


Vitamin B12    288  (180-914)  pg/mL


 


Folate    16.9  (5.90 - >24.8)  ng/mL


 


Stool H. pylori Ag  NEGATIVE    (Negative)  


 


Blood Type     


 


Blood Type Recheck   O POSITIVE   


 


Antibody Screen     


 


Crossmatch IS Only     














  02/15/24 02/15/24 Range/Units





  09:56 09:56 


 


WBC  7.2   (4.8-10.8)  x10^3/uL


 


RBC  3.17 L   (4.70-6.10)  10^6/uL


 


Hgb  6.7 L*   (14.0-18.0)  g/dL


 


Hct  23.7 L   (42.0-52.0)  %


 


MCV  74.8 L   (80.0-94.0)  fL


 


MCH  21.1 L   (27.0-31.0)  pg


 


MCHC  28.3 L   (32.0-36.0)  g/dL


 


RDW  16.6 H   (12.0-15.0)  %


 


Plt Count  234   (130-450)  10^3/uL


 


MPV  10.7   (7.4-11.4)  fL


 


Neut # (Auto)  5.4   (1.5-6.6)  10^3/uL


 


Lymph # (Auto)  0.6 L   (1.5-3.5)  10^3/uL


 


Mono # (Auto)  0.7   (0.0-1.0)  10^3/uL


 


Eos # (Auto)  0.4   (0.0-0.7)  10^3/uL


 


Baso # (Auto)  0.0   (0.0-0.1)  10^3/uL


 


Absolute Nucleated RBC  0.00   x10^3/uL


 


Nucleated RBC %  0.0   /100WBC


 


Sodium    (135-145)  mmol/L


 


Potassium    (3.5-4.5)  mmol/L


 


Chloride    (101-111)  mmol/L


 


Carbon Dioxide    (21-32)  mmol/L


 


Anion Gap    (6-13)  


 


BUN    (6-20)  mg/dL


 


Creatinine    (0.6-1.3)  mg/dL


 


Estimated GFR (MDRD)    (>89)  


 


Glucose    ()  mg/dL


 


Calcium    (8.5-10.3)  mg/dL


 


Magnesium    (1.7-2.3)  mg/dL


 


Iron    ()  ug/dL


 


TIBC    (250-450)  ug/dL


 


% Saturation    


 


Transferrin    (203-362)  mg/dL


 


Total Bilirubin    (0.2-1.0)  mg/dL


 


AST    (10-42)  IU/L


 


ALT    (10-60)  IU/L


 


Alkaline Phosphatase    ()  IU/L


 


Total Protein    (6.4-8.9)  g/dL


 


Albumin    (3.2-5.5)  g/dL


 


Globulin    (2.1-4.2)  g/dL


 


Albumin/Globulin Ratio    (1.0-2.2)  


 


Lipase    (11-82)  U/L


 


Carcinoembryonic Ag    ng/mL


 


Vitamin B12    (180-914)  pg/mL


 


Folate    (5.90 - >24.8)  ng/mL


 


Stool H. pylori Ag    (Negative)  


 


Blood Type   O POSITIVE  


 


Blood Type Recheck    


 


Antibody Screen   NEGATIVE  


 


Crossmatch IS Only   See Detail  














Assessment/Plan





- Problem List


(1) Anemia


Impression: 


Anemia 


2 weeks of HARE, blood work at PCP's office showed HgB of 6.4. Labs here 2-15-24 

showed HgB of 6.7, HCT 23.7, MCV 74.8, RDW 16.6, MCHC 28.3. Pt's stool positive 

for occult blood and (-) for H.Pylori. 1 unit of RBC's has been administered in 

ICU w/o complications. CT of abd/pelvis showed no significant findings. Vitals 

stable. Pt feeling well in room. No light headedness, dizziness, nausea, CP or 

SOB. Has been maintaining appetite.  





2-16-24 HgB this morning 7.0. Last night was 7.8 after 1 transfusion. Per nurse 

pt able to walk to bathroom this am without walker or assistance. 





Plan:


-Transfuse another unit of RBC's.


-Cardiologist consult, advised to stop ASA and plavix during stay. Cont eliquis 

after discharge.  


-Consult with Dr. Fallon Greenberg. NPO, clear liquid, bowel prep, Colonoscopy and 

endoscopy tomorrow 0800.


-Serial H&H ordered. 

















(2) Dyspnea on exertion


Impression: 





2 weeks of HARE at home, unable to walk short distances which is not normal for 

the patient. vitals here 2-15-26 at 15:32: 36.7C, HR 72, /60, RR 18, 98% 

RA. As above 1 unit of RBC's administered for iron deficient anemia. Pt able to 

walk to bathroom w/o walker or assistance, no CP or SOB while doing so. Will 

monitor overnight.





2-16-24: No CP or SOB when ambulating to the bathroom this morning. As above 

another unit of RBC's needed today.

## 2024-02-16 NOTE — CONSULTATION NOTE
Referring Provider


Name of Referring Provider:: Zuleima Dinero MD


Consult Date: 24 ([facility])





Chief Complaint





- Chief Complaint


Chief Complaint: dyspnea on exertion





History of Present Illness





- History Obtained From


Records Reviewed: yes


History obtained from: patient/Dr. Dinero


Exam Limitations: none





- History of Present Illness


HPI Comment/Other: 


78yoM admitted with HARE upon ambulating within his home, found to have HGB of 

6.7. Denies feeling fatigued, denies abdominal pain, nausea, or emesis/h

ematemesis. Denies diarrhea, melena, hematochezia. Typically has BM every 1-2d, 

however endorses constipation and it being "a few days" since last BM currently.

Tolerating normal diet at home and in hospital. 





Since admission Transfused 1U PRBC to hgb 7.8, however dropped again to 7.0 this

morning and now receiving 2nd unit PRBC. As no gross UGIB (hematemesis), no NGT 

has been placed. 





Patient s/p coronary stenting  followed by PEs in the acute post-

procedural period. Had remained on Eliquis, ASA, Plavix until admission; however

Dr. Dinero spoke with his Cardiologist who endorses that he needs to only be on 

Eliquis for Afib, though this can be held during admission; ASA/Plavix may be 

permanently discontinued.





History





- Past Medical History


Cardiovascular: reports: Hypertension, High cholesterol, MI, Atrial fibrill

ation, Other


Respiratory: reports: None


Neuro: reports: Seizure disorder


Endocrine/Autoimmune: reports: None


GI: reports: GERD, Hiatal hernia


: reports: None


HEENT: reports: None


Psych: reports: Depression, Anxiety, Panic attacks, Post traumatic stress 

disorder


Musculoskeletal: reports: None


Derm: reports: None


MRSA Hx?: No


Other Past Medical History: Diverticulosis





- Past Surgical History


General: reports: Other (Laparoscopic incisions consistent with TEP inguinal 

hernia repair, however patient does not recall surgery.)


Cardiovascular: reports: Coronary stent





- Family & Social History


Family History: Mother: , Father: 


Family History Comment/Other: Denies any medical Hx with his father, mother or 

siblings.


Living arrangement: At home


Living Situation: With spouse/s.o.





- Substance History


Use: Uses substance without health or social issues: Tobacco


Abuse: Recurrent use of substance despite neg consequences: NONE


Dependence: Experiences withdrawal or developed tolerances: NONE


Tobacco Details: E-Cigarettes





- POLST


Patient has POLST: No


POLST Status: Full Code





Meds/Allgy





- Home Medications


Home Medications: 


                                Ambulatory Orders











 Medication  Instructions  Recorded  Confirmed


 


Atorvastatin Calcium 80 mg PO DAILY 03/04/16 02/15/24


 


Levetiracetam [Keppra] 750 mg PO BID 03/04/16 02/15/24


 


Pantoprazole [Protonix] 20 mg PO BID 02/22/22 02/15/24


 


DULoxetine [Cymbalta] 60 mg PO BID 10/09/22 02/15/24


 


QUEtiapine [SEROquel] 100 mg PO QPM 10/09/22 02/15/24


 


Apixaban [Eliquis] 5 mg PO BID 02/15/24 02/15/24


 


Baclofen 10 mg PO TID PRN 02/15/24 02/15/24


 


Clopidogrel [Plavix] 75 mg PO DAILY 02/15/24 02/15/24


 


Lisinopril [Zestril] 5 mg PO DAILY 02/15/24 02/15/24


 


Metoprolol Succinate [Toprol Xl] 25 mg PO DAILY 02/15/24 02/15/24


 


QUEtiapine [SEROquel] 12.5 mg PO DAILY 02/15/24 02/15/24


 


Sertraline [Zoloft] 200 mg PO DAILY 02/15/24 02/15/24


 


Tamsulosin [Flomax] 0.4 mg PO DAILY 02/15/24 02/15/24


 


busPIRone [Buspar] 5 mg PO BID 02/15/24 02/15/24














- Allergies


Allergies/Adverse Reactions: 


                                    Allergies











Allergy/AdvReac Type Severity Reaction Status Date / Time


 


fluoxetine AdvReac Unknown Anxiety Verified 02/15/24 09:59


 


oxycodone HCl * AdvReac Unknown Hallucinati Verified 02/15/24 09:59





[From OxyContin]   ons  














Review of Systems





- Cardiovascular


Cariovascular: reports: Exertional dyspnea





- Gastrointestinal


Gastrointestinal: reports: Constipation





Exam





- Vital Signs


Reviewed Vital Signs: Yes


Vital Signs: 





                                Vital Signs x48h











  Temp Pulse Resp BP Pulse Ox


 


 24 12:50  37.1 C  75  12  95/60  95


 


 24 12:31  36.8 C  73  12  113/58 L  98


 


 24 11:51  36.8 C  73  16  101/53 L  97


 


 24 07:45  36.9 C  76  18  103/60  95














- Physical Exam


General Appearance: positive: No acute distress, Alert, Other (PALLOR)


Eyes Bilateral: positive: Normal inspection, PERRL


ENT: positive: ENT inspection nml, Pharynx nml, No signs of dehydration


Neck: positive: Nml inspection, Thyroid nml, No JVD, Trachea midline


Respiratory: positive: Chest non-tender, No respiratory distress, Breath sounds 

nml


Cardiovascular: positive: No murmur, No gallop


Peripheral Pulses: positive: 2+


Abdomen: positive: Non-tender, No organomegaly, Nml bowel sounds, No distention


Rectal: positive: Other (Minimally decreased tone on LORI, no gross blood on LORI,

no stool impaction on LORI)


Back: positive: Nml inspection


Skin: positive: Color nml, No rash, Warm, Dry, Pallor


Extremities: positive: Non-tender, Full ROM, Nml appearance


Neurologic/Psychiatric: positive: Oriented x3, Mood/affect nml





Conclusion/Plan





- Problem List


(1) Positive fecal occult blood test


Conclusion/Plan: 


79yoM with symptomatic anemia (HARE with hgb 6.7) and positive FOBT, transient 

reponse to 1U PRBC (7.8) however now requiring 2nd unit PRBC (for hgb 7.0). 

Admitted yesterday, soft BPs in 90s without tachycardia (on metoprolol), BP 

transiently improved to 110s but downtrended again with hgb drop after 1st unit;

making adequate urine (no ahmadi). ASA/Plavix have been dc'd, Eliquis has been 

held. Started on BID PPI. No gross upper or lower GIB observed. Discussed with 

hospitalist Dr. Dinero, will plan for endoscopy tomorrow to evaluate for 

potential source of GIB. 





- continue transfusion and serial H&H checks per hospitalist


- continue BID PPI


- continue to hold Eliquis


- split bowel prep ordered to be started now


- clear liquid diet now, NPO at midnight


- EGD and Colonoscopy tomorrow at 0800





Fallon Greenberg, DO [place]


General Surgeon











- Lab Results


Lab results reviewed: Yes


Fish Bones: 


                                 24 05:22





                                 02/15/24 09:56





- Diagnostic Imaging Results


Diagnostic Imaging Results: positive: Final report reviewed, See rad report


Diagnostic Imaging Results Comments: 





CT Abd/Pel [id number] with IV contrast - no significant findings in abdomen to 

account for GIB; diverticulosis without diverticulitis noted.

## 2024-02-17 VITALS — SYSTOLIC BLOOD PRESSURE: 116 MMHG | DIASTOLIC BLOOD PRESSURE: 65 MMHG | OXYGEN SATURATION: 99 %

## 2024-02-17 LAB
HCT VFR BLD AUTO: 27.7 % (ref 42–52)
HGB UR QL STRIP: 8.4 G/DL (ref 14–18)

## 2024-02-17 NOTE — ANESTHESIA POST OP EVALUATION
Anesthesia Post Eval





- Post Anesthesia Eval


Vitals: 





                                Last Vital Signs











Temp  36.3 C L  02/17/24 10:17


 


Pulse  70   02/17/24 10:17


 


Resp  16   02/17/24 10:17


 


BP  90/60   02/17/24 10:17


 


Pulse Ox  96   02/17/24 10:17


 


O2 Flow Rate      











CV Function Including HR & BP: Stable


Pain Control: Satisfactory


Nausea & Vomiting: Negative


Mental Status: Baseline


Respiratory Status: Airway Patent


Hydration Status: Satisfactory


Anesthesia Complications: None

## 2024-02-17 NOTE — DISCHARGE SUMMARY
Discharge Summary


Admit Date: 02/15/24


Discharge Date: 02/17/24


Discharging Provider: Dr. Zuleima Adams 


Code Status: Attempt Resuscitation


Condition at Discharge: Stable


Discharge Disposition: 01 Home, Self Care





- DIAGNOSES


Admission Diagnoses: 


Anemia 


2 weeks of HARE, blood work at PCP's office showed HgB of 6.4. Pt's stool 

positive for occult blood and (-) for H.Pylori. 2 unit of RBC's has been 

administered in ICU w/o complications. CT of abd/pelvis showed no significant 

findings. Vitals stable. Pt feeling well in room. No light headedness, dizziness

, nausea, CP or SOB. Has been maintaining appetite.  





2-17-24. HgB 8.4  EGD and colonoscopy done by Dr. Greenberg. EGD: Very mild antral 

gastritis, single gastric body polyp (biopsied), moderate hiatal hernia, random 

antral biopsies taken. [place]: Two adjacent polyps in descending colon (both 

biopsied); single cecal polyp <1cm (biopsied).  Otherwise normal endoscopies, no

obvious source of GIB identified on upper or lower endoscopy. 





2-16-24 HgB this morning 7.0. Last night was 7.8 after 1 transfusion. Per nurse 

pt able to walk to bathroom this am without walker or assistance. Will transfuse

second unit today. Cardiologist consult, advised to stop ASA and plavix during 

stay. Cont eliquis after discharge. 





2-15-24 labs showed HgB of 6.7, HCT 23.7, MCV 74.8, RDW 16.6, MCHC 28.3. 





Plan:


-Pt stable, able to be discharged. 


-Start eliquis, stop plavix


-avoid NSAID, cont PPI.


-Consider repeat out pt EGD, Colonoscopy.


-Consider small bowel follow through study. 





Dyspnea on exertion


Impression: 





2 weeks of HARE at home, unable to walk short distances which is not normal for 

the patient. Pt stable here. As above 1 unit of RBC's administered for iron 

deficient anemia. Pt able to walk to bathroom w/o walker or assistance, no CP or

SOB while doing so.





2-16-24: No CP or SOB when ambulating to the bathroom this morning. As above 

another unit of RBC's needed today.  





2-17-24: NAD, denies any SOB, CP, N/V/D. Lung sounds clear. HARE resolved. 








- HPI


History of Present Illness: 


78 YOM with a Hx of MI, HTN, HDL, anxiety and depression presented to the ER for

HARE which has been going on for two weeks now, feels short of breath just 

walking to the fridge and back which is not normal for him. Takes him about 5-10

min of rest after he walks to feel back to normal. Denies any associated chest 

pain, he does states he gets a "deep feeling" in his chest but denies 

discomfort. Pt was seen at PCP's office yesterday and was found to have a HgB of

6.4, told to go to the ER which requested admission after a repeat HgB of 6.7 

and Iron of < 10. Pt denies any chest, shoulder or jaw pain associated with his 

HARE. No syncope, light headedness, dizziness, fever, chills, sweats, N/V/D, abd 

pain. Last BM yesterday, no dark stools or hematochezia. He reports Hx of 

nicotine vaping, goes through one vape pen every couple of weeks. No alcohol. 








- HOSPITAL COURSE


Hospital Course: 


 Pt presented with 2 weeks of HARE, found to be anemic with a HgB of 6.7 in the 

ER, transfused 2 units of blood, final HgB here was 8.4. Pt was also transfused 

a unit of iron due to iron level being 21. EGD and colonoscopy did not find a 

bleeding source here. Pt overall asymptomatic during stay, vitals remained 

stable, no difficulty ambulating. Pt to restart eliquis at home and stop plavix 

per cardiologist, avoid NSAIDS and cont PPI. 








- ALLERGIES


Allergies/Adverse Reactions: 


                                    Allergies











Allergy/AdvReac Type Severity Reaction Status Date / Time


 


fluoxetine AdvReac Unknown Anxiety Verified 02/15/24 09:59


 


oxycodone HCl * AdvReac Unknown Hallucinati Verified 02/15/24 09:59





[From OxyContin]   ons  














- MEDICATIONS


Home Medications: 


                                Ambulatory Orders











 Medication  Instructions  Recorded  Confirmed


 


Atorvastatin Calcium 80 mg PO DAILY 03/04/16 02/15/24


 


Levetiracetam [Keppra] 750 mg PO BID 03/04/16 02/15/24


 


Pantoprazole [Protonix] 20 mg PO BID 02/22/22 02/15/24


 


DULoxetine [Cymbalta] 60 mg PO BID 10/09/22 02/15/24


 


QUEtiapine [SEROquel] 100 mg PO QPM 10/09/22 02/15/24


 


Apixaban [Eliquis] 5 mg PO BID 02/15/24 02/15/24


 


Baclofen 10 mg PO TID PRN 02/15/24 02/15/24


 


Lisinopril [Zestril] 5 mg PO DAILY 02/15/24 02/15/24


 


Metoprolol Succinate [Toprol Xl] 25 mg PO DAILY 02/15/24 02/15/24


 


QUEtiapine [SEROquel] 12.5 mg PO DAILY 02/15/24 02/15/24


 


Sertraline [Zoloft] 200 mg PO DAILY 02/15/24 02/15/24


 


Tamsulosin [Flomax] 0.4 mg PO DAILY 02/15/24 02/15/24


 


busPIRone [Buspar] 5 mg PO BID 02/15/24 02/15/24


 


Ferrous Gluconate 324 mg PO DAILY #30 tablet 02/17/24 














- PHYSICAL EXAM AT DISCHARGE


General Appearance: positive: No acute distress, Alert


Eyes Bilateral: positive: PERRL, EOMI


ENT: positive: ENT inspection nml


Neck: positive: Nml inspection


Respiratory: positive: Chest non-tender, No respiratory distress, Breath sounds 

nml


Cardiovascular: positive: Systolic murmur


Peripheral Pulses: positive: 2+


Abdomen: positive: Non-tender, No distention


Skin: positive: No rash, Warm, Dry, Cyanosis


Extremities: positive: Non-tender


Neurologic/Psychiatric: positive: Oriented x3, Mood/affect nml





- LABS


Result Diagrams: 


                                 02/17/24 07:45





                                 02/15/24 09:56





- TIME SPENT


Time Spent in Discharge (Minutes): 45

## 2024-02-17 NOTE — DISCHARGE PLAN
Discharge Plan


Problem Reviewed?: Yes


Disposition: 01 Home, Self Care


Condition: Stable


Diet: Regular


Shower Restrictions: No


Driving Restrictions: Yes (no driving)


Health Concerns: 


You came to the hospital because of anemia.  You had gone to see your doctor for

weakness and shortness of breath and they found to be severely anemic.  So they 

asked you to come to the emergency room.  In the emergency room you had a 

slightly low blood pressure, and you were still very anemic.  They checked your 

stool because anemia can sometimes come from losing blood in your GI tract.  

Your stool did have trace amounts of blood in it.  We transfused you 2 units of 

blood to make sure that your anemia improved.  





A normal amount of blood in the man is 14 g of hemoglobin.  When you were 

admitted you were 6.7.  After transfusion of 2 units you are now 8.4.  We have 

given you a n infusion of iron, and have started you on iron tablets.  In the 

next few weeks, your anemia will get better and better on its own with the use 

of iron.  You also underwent an upper and lower endoscopy to look for sources of

bleeding in your stomach or colon.  You have polyps in your stomach and polyps 

in your colon but no source of bleeding was seen.  Since your blood work is 

stable, blood pressure is stable, and you are doing well, you feel that you can 

go home and want to go home.





.


Plan of Treatment: 


I spoke to your cardiologist, Dr. Schmitt, and he wants you to stop the Plavix. 





You cannot take anything like aspirin, Aleve, Motrin, Celebrex, ibuprofen, 

Naprosyn, or Voltaren.  The only over-the-counter pain medicine or anti-

inflammatory medicine you can take is Tylenol.





Please continue taking Protonix, a stomach acid reducing drug, indefinitely.





Dr. Schmitt would like you to resume your Eliquis.  There is a chance that you may

have anemia again.  If you do have anemia again, your primary care provider will

need to send you to gastroenterology specialist for more studies of your bowel 

and stomach.  For instance they may do something called a small bowel follow-

through or they may do a video endoscopy to look at your small bowel to see if 

your small bowel may be the source of bleeding.  You will have to stop your 

Eliquis.  If you do have to stop your Eliquis, Dr. Schmitt would like to see you 

for something called a Watchman procedure.  The reason you take Eliquis is to 

reduce your risk of stroke from atrial fibrillation.  If you cannot take a blood

thinner, your risk of stroke goes up, and a Watchman procedure would solve that 

problem.


Care Goals: 


To go back to a steady state where you do not have to worry about anemia or 

bleeding


Assessment: 


Patient and wife have memory loss.  Patient's wife states that they rely on 

their sons girlfriend to make sure he is on the correct medicine.


No Smoking: If you smoke, Please STOP!  Call [phone] for help.

## 2024-02-17 NOTE — PROVIDER PROGRESS NOTE
Subjective





- General


Admit Date: 02/15/24





- Other


Other Information/Narrative: 





Hgb increased to 8.4 from 7.0 yesterday afternoon after 2nd unit PRBC. Overnight

HDN/afebrile, SBP up to 130s from 90s, no complaints or acute events. This 

morning Hgb stable again at 8.4. Completed colonoscopy prep and able to drink 

all golytely, endorses not completely clear BM as of this morning. No abdominal 

pain, no nausea. 





Objective





- Patient Data


Vital Signs: 





                                Vital Signs x48h











  Temp Pulse Resp BP Pulse Ox


 


 02/17/24 05:46  36.5 C  79  16  132/71 H  95











Weight: 





                                     Weight











 02/15/24 02/16/24 02/17/24





 23:59 23:59 23:59


 


Weight (kg) 72 kg  72 kg











Intake & Output: 





                          Intake and Output Totals x24h











 02/15/24 02/16/24 02/17/24





 23:59 23:59 23:59


 


Intake Total 790 3160 


 


Output Total 400  


 


Balance 390 3160 














- Lab Results


Lab Results: 


                                 02/17/24 07:45





                                 02/15/24 09:56


Other Lab Results: 





                               Lab Results x24hrs











  02/17/24 02/16/24 02/15/24 Range/Units





  07:45 15:53 09:56 


 


Hgb  8.4 L  8.4 L   (14.0-18.0)  g/dL


 


Hct  27.7 L  28.3 L   (42.0-52.0)  %


 


Blood Type    O POSITIVE  


 


Antibody Screen    NEGATIVE  


 


Crossmatch IS Only    See Detail  














- Current Medications


Current Medications: 





                               Current Medications











Generic Name Dose Route Start Last Admin





  Trade Name Amanda  PRN Reason Stop Dose Admin


 


Atorvastatin Calcium  80 mg  02/16/24 09:00  02/16/24 08:21





  Atorvastatin 40 Mg Tablet  PO   80 mg





  DAILY BRIAN   Administration


 


Buspirone HCl  5 mg  02/15/24 21:00  02/16/24 21:08





  Buspirone 5 Mg Tablet  PO   5 mg





  BID BRIAN   Administration


 


Docusate Sodium  250 - 500 mg  02/16/24 10:00  02/16/24 10:42





  Docusate Sodium 250 Mg Capsule  PO   250 mg





  DAILY BRIAN   Administration


 


Duloxetine HCl  60 mg  02/15/24 21:00  02/16/24 21:07





  Duloxetine 30 Mg Capsule  PO   60 mg





  BID BRIAN   Administration


 


Levetiracetam  750 mg  02/15/24 21:00  02/16/24 21:08





  Levetiracetam 250 Mg Tablet  PO   750 mg





  BID BRIAN   Administration


 


Lisinopril  5 mg  02/16/24 09:00  02/16/24 08:23





  Lisinopril 5 Mg Tablet  PO   5 mg





  DAILY BRIAN   Administration


 


Metoprolol Succinate  25 mg  02/16/24 09:00  02/16/24 19:47





  Metoprolol Succinate 25 Mg Tablet  PO   Not Given





  DAILY BRIAN  


 


Pantoprazole Sodium  40 mg  02/16/24 16:00  02/17/24 06:16





  Pantoprazole 40 Mg Vial  IVP   40 mg





  QDAC BRIAN   Administration


 


Quetiapine Fumarate  12.5 mg  02/16/24 09:00  02/16/24 08:22





  Quetiapine 25 Mg Tablet  PO   12.5 mg





  DAILY BRIAN   Administration


 


Quetiapine Fumarate  100 mg  02/15/24 21:00  02/16/24 21:07





  Quetiapine 25 Mg Tablet  PO   100 mg





  QPM BRIAN   Administration


 


Sertraline HCl  200 mg  02/16/24 09:00  02/16/24 08:23





  Sertraline 50 Mg Tablet  PO   200 mg





  DAILY BRIAN   Administration


 


Sodium Chloride  10 ml  02/15/24 13:33  02/15/24 14:53





  Sodium Chloride Flush 0.9% 10 Ml Syringe  IVP   10 ml





  PRN PRN   Administration





  AS NEEDED PER PROVIDER ORDERS  


 


Sodium Chloride  10 ml  02/15/24 17:00  02/17/24 00:03





  Sodium Chloride Flush 0.9% 10 Ml Syringe  IVP   10 ml





  0100,0900,1700 BRIAN   Administration


 


Tamsulosin HCl  0.4 mg  02/16/24 09:00  02/16/24 08:22





  Tamsulosin 0.4 Mg Capsule  PO   0.4 mg





  DAILY BRIAN   Administration














- Physical Exam


General Appearance: positive: No acute distress, Alert


Eyes Bilateral: positive: Normal inspection, PERRL


Respiratory: positive: Chest non-tender, No respiratory distress, Breath sounds 

nml


Abdomen: positive: Non-tender, No organomegaly, Nml bowel sounds, No distention





ABX Reporting


Has patient been on IV antibiotics over the past 48 hours?: No





Impression/Plan





- Problem List


Problem List: 





HD3 for symptomatic anemia (HARE and +FOBT), currently stabilized after 2U PRBC 

with normal hemodynamics and hgb 8.4. Has completed bowel prep and will proceed 

with EGD and Colonoscopy this morning to evaluate for potential GI source of 

anemia. Indications (identify and possibly treat bleeding source), alternatives 

(no endoscopy with risk of failing to identify bleeding source), and risks 

(pain, bleeding, perforation, need for further procedure or even surgery, risk 

of anesthesia to include MI, PE, CVA, dealth) discussed with patient. Consent on

chart.





Fallon Greenberg DO, FACS


General Surgeon

## 2024-02-17 NOTE — PROVIDER PROGRESS NOTE
Subjective





- Prog Note Date


Prog Note Date: 02/17/24


Prog Note Time: 12:00





- Subjective


Pt reports feeling: No change (Pt alert and oriented post EGD and Colonoscopy 

procedure this AM. Denies any pain, SOB, abd pain, N/V/D.)





Objective





- Vital Signs/Intake & Output


Vital Signs: 


                                Vital Signs x48h











  Temp Pulse Pulse Resp BP BP Pulse Ox


 


 02/17/24 10:45   68   14  96/59 L   96


 


 02/17/24 10:17  36.3 C L  70   16  90/60   96


 


 02/17/24 10:08  36.3 C L  72   18  92/57 L   97


 


 02/17/24 05:46  36.5 C   79  16   132/71 H  95











Intake & Output: 


                                 Intake & Output











 02/14/24 02/15/24 02/16/24 02/17/24





 23:59 23:59 23:59 23:59


 


Intake Total  790 3160 


 


Output Total  400  


 


Balance  390 3160 














- Objective


General Appearance: positive: No acute distress


Eyes Bilateral: positive: EOMI


ENT: positive: ENT inspection nml


Neck: positive: Nml inspection


Respiratory: positive: Chest non-tender, No respiratory distress, Breath sounds 

nml


Cardiovascular: positive: Systolic murmur


Skin: positive: No rash, Warm, Dry.  negative: Cyanosis


Extremities: positive: Non-tender


Neurologic/Psychiatric: positive: Oriented x3





- Lab Results


Fish Bones: 


                                 02/17/24 07:45





                                 02/15/24 09:56


Other Labs: 


                               Lab Results x24hrs











  02/17/24 02/16/24 02/15/24 Range/Units





  07:45 15:53 09:56 


 


Hgb  8.4 L  8.4 L   (14.0-18.0)  g/dL


 


Hct  27.7 L  28.3 L   (42.0-52.0)  %


 


Blood Type    O POSITIVE  


 


Antibody Screen    NEGATIVE  


 


Crossmatch IS Only    See Detail  














Assessment/Plan





- Problem List


(1) Anemia


Impression: 





Anemia 


2 weeks of HARE, blood work at PCP's office showed HgB of 6.4. Labs here 2-15-24 

showed HgB of 6.7, HCT 23.7, MCV 74.8, RDW 16.6, MCHC 28.3. Pt's stool positive 

for occult blood and (-) for H.Pylori. 1 unit of RBC's has been administered in 

ICU w/o complications. CT of abd/pelvis showed no significant findings. Vitals 

stable. Pt feeling well in room. No light headedness, dizziness, nausea, CP or 

SOB. Has been maintaining appetite.  





2-16-24 HgB this morning 7.0. Last night was 7.8 after 1 transfusion. Per nurse 

pt able to walk to bathroom this am without walker or assistance. Will transfuse

second unit today. Cardiologist consult, advised to stop ASA and plavix during 

stay. Cont eliquis after discharge. 





2-17-24. HgB 8.4  EGD and colonoscopy done by Dr. Greenberg. EGD: Very mild antral 

gastritis, single gastric body polyp (biopsied), moderate hiatal hernia type III

(DH@ 43cm, Z line 38cm), random antral biopsies taken. Pacolet: Two adjacent polyps

in descending colon (<1cm each, both biopsied); single cecal polyp <1cm 

(biopsied).  Otherwise normal endoscopies, no obvious source of GIB identified 

on upper or lower endoscopy. 





Plan:


-Serial H&H ordered. 


-Start eliquis


-Consider repeat out pt EGD, Colonoscopy.


-Consider small bowel follow through. 


-Repeat guaiac test. 

















(2) Dyspnea on exertion


Impression: 


 (2) Dyspnea on exertion


Impression: 





2 weeks of HARE at home, unable to walk short distances which is not normal for 

the patient. Pt stable here. As above 1 unit of RBC's administered for iron 

deficient anemia. Pt able to walk to bathroom w/o walker or assistance, no CP or

SOB while doing so.





2-16-24: No CP or SOB when ambulating to the bathroom this morning. As above 

another unit of RBC's needed today.  





2-17-24: NAD, denies any SOB, CP, N/V/D. Lung sounds clear.

## 2024-02-17 NOTE — ANESTHESIA
Pre-Anesthesia VS, & Labs





- Diagnosis





GI bleed





- Procedure





EGD, colonoscopy


Vital Signs: 





                                        











Temp Pulse Resp BP Pulse Ox O2 Flow Rate


 


 36.5 C   79   16   132/71 H  95    


 


 02/17/24 05:46  02/17/24 05:46  02/17/24 05:46  02/17/24 05:46  02/17/24 05:46 

 











Height: 6 ft


Weight (kg): 72 kg


Body Mass Index: 21.5


BMI Classification: Normal





- NPO


>8 hours





- Lab Results


Current Lab Results: 





Laboratory Tests





02/16/24 15:53: Hgb 8.4 L, Hct 28.3 L


02/16/24 05:22: WBC 7.6, RBC 3.08 L, Hgb 7.0 L*, Hct 23.5 L, MCV 76.3 L, MCH 

22.7 L, MCHC 29.8 L, RDW 17.4 H, Plt Count 197, MPV 11.0, Neut # (Auto) 5.2, 

Lymph # (Auto) 0.9 L, Mono # (Auto) 0.9, Eos # (Auto) 0.5, Baso # (Auto) 0.0, 

Absolute Nucleated RBC 0.00, Nucleated RBC % 0.0


02/15/24 17:40: Carcinoembryonic Ag 2.9


02/15/24 17:40: WBC 6.7, RBC 3.48 L, Hgb 7.8 L, Hct 26.9 L, MCV 77.3 L, MCH 22.4

L, MCHC 29.0 L, RDW 17.3 H, Plt Count 222, MPV 10.4


02/15/24 10:03: Blood Type Recheck O POSITIVE


02/15/24 09:56: Sodium 136, Potassium 4.3, Chloride 102, Carbon Dioxide 29, 

Anion Gap 5.0 L, BUN 26 H, Creatinine 1.1, Estimated GFR (MDRD) 65 L, Glucose 

101, Calcium 9.1, Magnesium 2.0, Iron < 10 L, TIBC 410, % Saturation TNP, 

Transferrin 293, Total Bilirubin 0.4, AST 19, ALT 9 L, Alkaline Phosphatase 74, 

Total Protein 7.0, Albumin 4.0, Globulin 3.0, Albumin/Globulin Ratio 1.3, Lipase

24, Vitamin B12 288, Folate 16.9


02/15/24 09:56: WBC 7.2, RBC 3.17 L, Hgb 6.7 L*, Hct 23.7 L, MCV 74.8 L, MCH 

21.1 L, MCHC 28.3 L, RDW 16.6 H, Plt Count 234, MPV 10.7, Neut # (Auto) 5.4, 

Lymph # (Auto) 0.6 L, Mono # (Auto) 0.7, Eos # (Auto) 0.4, Baso # (Auto) 0.0, 

Absolute Nucleated RBC 0.00, Nucleated RBC % 0.0


02/15/24 09:56: Blood Type O POSITIVE, Antibody Screen NEGATIVE, Crossmatch IS 

Only See Detail








Fish Bones: 


                                 02/17/24 07:45





                                 02/15/24 09:56





Home Medications and Allergies


Home Medications: 


Ambulatory Orders





Apixaban [Eliquis] 5 mg PO BID 02/15/24 


Baclofen 10 mg PO TID PRN 02/15/24 


Clopidogrel [Plavix] 75 mg PO DAILY 02/15/24 


Lisinopril [Zestril] 5 mg PO DAILY 02/15/24 


Metoprolol Succinate [Toprol Xl] 25 mg PO DAILY 02/15/24 


QUEtiapine [SEROquel] 12.5 mg PO DAILY 02/15/24 


Sertraline [Zoloft] 200 mg PO DAILY 02/15/24 


Tamsulosin [Flomax] 0.4 mg PO DAILY 02/15/24 


busPIRone [Buspar] 5 mg PO BID 02/15/24 











Active Medications





Acetaminophen (Acetaminophen 325 Mg Tablet)  650 mg PO Q4HR PRN


   PRN Reason: Pain 1 to 4, or Fever


Atorvastatin Calcium (Atorvastatin 40 Mg Tablet)  80 mg PO DAILY [facility]


   Last Admin: 02/16/24 08:21 Dose:  80 mg


   


Baclofen (Baclofen 10 Mg Tablet)  10 mg PO TID PRN


   PRN Reason: Hiccups


Buspirone HCl (Buspirone 5 Mg Tablet)  5 mg PO BID [facility]


   Last Admin: 02/16/24 21:08 Dose:  5 mg


   


Docusate Sodium (Docusate Sodium 250 Mg Capsule)  250 - 500 mg PO DAILY [facility]


   Last Admin: 02/16/24 10:42 Dose:  250 mg


   


Duloxetine HCl (Duloxetine 30 Mg Capsule)  60 mg PO BID [facility]


   Last Admin: 02/16/24 21:07 Dose:  60 mg


   


Levetiracetam (Levetiracetam 250 Mg Tablet)  750 mg PO BID [facility]


   Last Admin: 02/16/24 21:08 Dose:  750 mg


   


Lisinopril (Lisinopril 5 Mg Tablet)  5 mg PO DAILY [facility]


   Last Admin: 02/16/24 08:23 Dose:  5 mg


   


Metoprolol Succinate (Metoprolol Succinate 25 Mg Tablet)  25 mg PO DAILY [facility]


   Last Admin: 02/16/24 19:47 Dose:  Not Given


   


Ondansetron HCl (Ondansetron Odt 4 Mg Tablet)  4 mg TL Q6HR PRN


   PRN Reason: Nausea / Vomiting


Ondansetron HCl (Ondansetron 4 Mg/2 Ml Vial)  4 mg IVP Q6HR PRN


   PRN Reason: Nausea / Vomiting


Pantoprazole Sodium (Pantoprazole 40 Mg Vial)  40 mg IVP QDAC [facility]


   Last Admin: 02/17/24 06:16 Dose:  40 mg


   


Quetiapine Fumarate (Quetiapine 25 Mg Tablet)  12.5 mg PO DAILY [facility]


   Last Admin: 02/16/24 08:22 Dose:  12.5 mg


   


Quetiapine Fumarate (Quetiapine 25 Mg Tablet)  100 mg PO QPM [facility]


   Last Admin: 02/16/24 21:07 Dose:  100 mg


   


Sertraline HCl (Sertraline 50 Mg Tablet)  200 mg PO DAILY [facility]


   Last Admin: 02/16/24 08:23 Dose:  200 mg


   


Sodium Chloride (Sodium Chloride Flush 0.9% 10 Ml Syringe)  10 ml IVP PRN PRN


   PRN Reason: AS NEEDED PER PROVIDER ORDERS


   Last Admin: 02/15/24 14:53 Dose:  10 ml


   


Sodium Chloride (Sodium Chloride Flush 0.9% 10 Ml Syringe)  10 ml IVP 0100,0900

,1700 [facility]


   Last Admin: 02/17/24 00:03 Dose:  10 ml


   


Tamsulosin HCl (Tamsulosin 0.4 Mg Capsule)  0.4 mg PO DAILY [facility]


   Last Admin: 02/16/24 08:22 Dose:  0.4 mg


   


Temazepam (Temazepam 15 Mg Capsule)  15 mg PO QPM PRN


   PRN Reason: Insomnia





                                        





Atorvastatin Calcium 80 mg PO DAILY 03/04/16 


Levetiracetam [Keppra] 750 mg PO BID 03/04/16 


Pantoprazole [Protonix] 20 mg PO BID 02/22/22 


DULoxetine [Cymbalta] 60 mg PO BID 10/09/22 


QUEtiapine [SEROquel] 100 mg PO QPM 10/09/22 


Apixaban [Eliquis] 5 mg PO BID 02/15/24 


Baclofen 10 mg PO TID PRN 02/15/24 


Clopidogrel [Plavix] 75 mg PO DAILY 02/15/24 


Lisinopril [Zestril] 5 mg PO DAILY 02/15/24 


Metoprolol Succinate [Toprol Xl] 25 mg PO DAILY 02/15/24 


QUEtiapine [SEROquel] 12.5 mg PO DAILY 02/15/24 


Sertraline [Zoloft] 200 mg PO DAILY 02/15/24 


Tamsulosin [Flomax] 0.4 mg PO DAILY 02/15/24 


busPIRone [Buspar] 5 mg PO BID 02/15/24 








Allergies/Adverse Reactions: 


                                    Allergies











Allergy/AdvReac Type Severity Reaction Status Date / Time


 


fluoxetine AdvReac Unknown Anxiety Verified 02/15/24 09:59


 


oxycodone HCl * AdvReac Unknown Hallucinati Verified 02/15/24 09:59





[From OxyContin]   ons  














Anes History & Medical History





- Anesthetic History


Anesthesia Complications: reports: No previous complications





- Medical History


Cardiovascular: reports: Hypertension, High cholesterol, MI, Atrial 

fibrillation, Other


Pulmonary: reports: None


Gastrointestinal: reports: GERD, Hiatal hernia


Urinary: reports: None


Neuro: reports: Seizure disorder


Musculoskeletal: reports: None


Endocrine/Autoimmune: reports: None


Blood Disorders: reports: None


Skin: reports: None


Smoking Status: Current every day smoker (vapes)


Other Past Medical History: Diverticulosis





- Surgical History


General: reports: Other (Laparoscopic incisions consistent with TEP inguinal 

hernia repair, however patient does not recall surgery.)


Cardiothoracic: reports: Coronary stent





Exam


General: Alert, Oriented x3


Dental: WNL, Poor dentition


Mouth Opening: Greater than 4 Fingerbreadths


Neck Mobility: Normal


Mallampati classification: I


Thyromental Distance: greater than 6 cm


Respiratory: Lungs clear


Cardiovascular: Regular rate





Plan


Anesthesia Type: Total IV


Consent for Procedure(s) Verified and Reviewed: Yes


Code Status: Attempt Resuscitation


ASA classification: 4-Incapacitating disease


Is this case an emergency?: Yes

## 2024-08-09 ENCOUNTER — HOSPITAL ENCOUNTER (OUTPATIENT)
Dept: HOSPITAL 76 - DI | Age: 79
Discharge: HOME | End: 2024-08-09
Attending: NURSE PRACTITIONER
Payer: OTHER GOVERNMENT

## 2024-08-09 DIAGNOSIS — S72.001A: Primary | ICD-10-CM

## 2024-08-09 DIAGNOSIS — M85.89: ICD-10-CM

## 2024-08-09 NOTE — DEXA REPORT
PROCEDURE: Dexa Spine and/or Hip

 

INDICATIONS: FEMUR FX

 

TECHNIQUE: Dual energy x-ray absorptiometry (DXA) was performed on a PanX System.  Regions measur
ed are the AP Spine, femoral neck, and if needed forearm.

 

COMPARISON: None

  

FINDINGS:

 

Lumbar Spine (L1-L3): 

Bone Mineral Density: 1.370 g/cm/cm,T score:  1.3. Normal.

 

Left Femoral Neck:

Bone Mineral Density: 0.834 g/cm/cm, T score: -1.8. 

 

Left Hip:

Bone Mineral Density: 0.812 g/cm/cm,T score: -2. Osteopenia

 

FRAX risk factors: None given.

10 year risk of major osteoporotic fracture: 8.4%

major osteoporotic fracture = hip, clinical vertebral, proximal humerus, distal forearm

10 year risk of hip fracture: 3.1%

 

(T score greater or equal to -1.0: NORMAL)

(T score from -1.1 to -2.4: OSTEOPENIA)

(T score less than or equal to -2.5 to: OSTEOPOROSIS)

 

Impression: 

1.By WHO criteria, this patient has low bone density (osteopenia). 

2.The 10 year risk of major osteoporotic fracture is 8.4% and of a hip fracture is 3.1%. 

 

Patients with diagnosis of osteoporosis or osteopenia should have regular bone mineral density assess
ment.  For those eligible for Medicare, routine testing is allowed once every 2 years.  Testing frequ
ency can be increased for patients who have rapidly progressing disease or for those who are receivin
g medical therapy to restore bone mass.

 

Reviewed by: Tequila Rivera MD on 8/9/2024 5:15 PM PDT

Approved by: Tequila Rivera MD on 8/9/2024 5:15 PM PDT

 

 

Station ID:  [id number]